# Patient Record
Sex: FEMALE | Race: WHITE | NOT HISPANIC OR LATINO | Employment: FULL TIME | URBAN - METROPOLITAN AREA
[De-identification: names, ages, dates, MRNs, and addresses within clinical notes are randomized per-mention and may not be internally consistent; named-entity substitution may affect disease eponyms.]

---

## 2017-01-26 LAB
EXTERNAL ABO GROUPING: NORMAL
EXTERNAL ANTIBODY SCREEN: NEGATIVE
EXTERNAL CHLAMYDIA SCREEN: NEGATIVE
EXTERNAL GONORRHEA SCREEN: NEGATIVE
EXTERNAL HEPATITIS B SURFACE ANTIGEN: NEGATIVE
EXTERNAL RH FACTOR: POSITIVE
EXTERNAL RUBELLA QUALITATIVE: NORMAL
EXTERNAL SYPHILIS RPR SCREEN: NORMAL
EXTERNAL URINE DRUG SCREEN: NEGATIVE
HIV1 AB SPEC QL IA.RAPID: NEGATIVE

## 2017-02-03 ENCOUNTER — OFFICE VISIT (OUTPATIENT)
Dept: INTERNAL MEDICINE | Facility: CLINIC | Age: 37
End: 2017-02-03

## 2017-02-03 VITALS — HEIGHT: 69 IN | BODY MASS INDEX: 20.06 KG/M2 | WEIGHT: 135.4 LBS | TEMPERATURE: 98.9 F

## 2017-02-03 DIAGNOSIS — J32.9 OTHER SINUSITIS, UNSPECIFIED CHRONICITY: Primary | ICD-10-CM

## 2017-02-03 PROCEDURE — 99213 OFFICE O/P EST LOW 20 MIN: CPT | Performed by: FAMILY MEDICINE

## 2017-02-03 RX ORDER — CEFDINIR 300 MG/1
300 CAPSULE ORAL 2 TIMES DAILY
Qty: 20 CAPSULE | Refills: 0 | Status: SHIPPED | OUTPATIENT
Start: 2017-02-03 | End: 2017-02-09 | Stop reason: SINTOL

## 2017-02-03 NOTE — PROGRESS NOTES
"Subjective   Jos Zuleta is a 36 y.o. female.     History of Present Illness   Here today as a work in for sinus infection. PT IS 11WK PREGNANT.  Last seen 12/22/16 for sinusitism treated with biaxin. Was seen 5/16/16 for routine. Was seen for sinusitis 9/12/14, treated with levaquin.     RESP- today pt reports she has been sick for about a week. Is having head and chest congestion. Is having yellow, brown nasal discharge.     The following portions of the patient's history were reviewed and updated as appropriate: current medications, past family history, past medical history, past social history, past surgical history and problem list.    Review of Systems   Constitutional: Positive for chills and fever.   HENT: Positive for postnasal drip and rhinorrhea.    Respiratory: Positive for cough and shortness of breath.    Cardiovascular: Positive for chest pain.   Gastrointestinal: Negative for abdominal distention and abdominal pain.   Musculoskeletal: Positive for myalgias.   Skin: Negative for color change.   Neurological: Positive for headaches. Negative for tremors and speech difficulty.   Psychiatric/Behavioral: Negative for agitation and confusion.   All other systems reviewed and are negative.        Current Outpatient Prescriptions:   •  cefdinir (OMNICEF) 300 MG capsule, Take 1 capsule by mouth 2 (Two) Times a Day., Disp: 20 capsule, Rfl: 0  •  Prenatal Vit-Fe Fumarate-FA (PREPLUS) 27-1 MG tablet, , Disp: , Rfl:   •  ZOLMitriptan (ZOMIG-ZMT) 5 MG disintegrating tablet, Dissolve 1 tab on tongue and swallow at onset of migraine. May repeat once after 2 hours. Max 10 mg/day, Disp: 9 tablet, Rfl: 11    Objective     Visit Vitals   • Temp 98.9 °F (37.2 °C)   • Ht 69\" (175.3 cm)   • Wt 135 lb 6.4 oz (61.4 kg)   • BMI 20 kg/m2       Physical Exam   Constitutional: She is oriented to person, place, and time. She appears well-developed and well-nourished.   HENT:   Right Ear: Tympanic membrane and ear canal normal. "   Left Ear: Tympanic membrane and ear canal normal.   Mouth/Throat: Oropharynx is clear and moist.   Eyes: Conjunctivae and EOM are normal. Pupils are equal, round, and reactive to light.   Neck: No thyromegaly present.   Cardiovascular: Normal rate and regular rhythm.    Pulmonary/Chest: Effort normal and breath sounds normal.   Neurological: She is alert and oriented to person, place, and time.   Skin: Skin is warm and dry.   Psychiatric: She has a normal mood and affect.   Vitals reviewed.      Assessment/Plan   Jos was seen today for sinusitis.    Diagnoses and all orders for this visit:    Other sinusitis, unspecified chronicity  -     cefdinir (OMNICEF) 300 MG capsule; Take 1 capsule by mouth 2 (Two) Times a Day.      1. RESP- sinusitis in first trimester of pregnancy. Will use omnicef and pt can use plain claritin (loratadine). Discussed that some of her fatigue is from the pregnancy. She has had her flu shot.   2. RECHECK- prn

## 2017-02-03 NOTE — PATIENT INSTRUCTIONS
1. RESP- sinusitis in first trimester of pregnancy. Will use omnicef and pt can use plain claritin (loratadine). Discussed that some of her fatigue is from the pregnancy. She has had her flu shot.   2. RECHECK- prn

## 2017-02-09 ENCOUNTER — TELEPHONE (OUTPATIENT)
Dept: INTERNAL MEDICINE | Facility: CLINIC | Age: 37
End: 2017-02-09

## 2017-02-09 RX ORDER — AMOXICILLIN AND CLAVULANATE POTASSIUM 875; 125 MG/1; MG/1
1 TABLET, FILM COATED ORAL 2 TIMES DAILY
Qty: 20 TABLET | Refills: 0 | Status: SHIPPED | OUTPATIENT
Start: 2017-02-09 | End: 2017-02-19

## 2017-02-09 NOTE — TELEPHONE ENCOUNTER
Pt , Irineo, called to say that pt abx (cefdinir) is causing the patient to have very bad migraines. He states that this happened with her first pregnancy and we changed the abx to something else. I looked back in the patients chart and couldn't find anything regarding this as we do not have all our records from [facility]. Per PATTY we can send in augmentin as this is safe for pregnancy. Pt  informed and will send rx to pharm.

## 2017-05-16 ENCOUNTER — OFFICE VISIT (OUTPATIENT)
Dept: INTERNAL MEDICINE | Facility: CLINIC | Age: 37
End: 2017-05-16

## 2017-05-16 VITALS
DIASTOLIC BLOOD PRESSURE: 74 MMHG | WEIGHT: 147.8 LBS | BODY MASS INDEX: 21.89 KG/M2 | TEMPERATURE: 99 F | HEIGHT: 69 IN | SYSTOLIC BLOOD PRESSURE: 118 MMHG

## 2017-05-16 DIAGNOSIS — G43.909 MIGRAINE SYNDROME: Primary | ICD-10-CM

## 2017-05-16 DIAGNOSIS — I83.93 VARICOSE VEINS OF BOTH LOWER EXTREMITIES: ICD-10-CM

## 2017-05-16 PROCEDURE — 99213 OFFICE O/P EST LOW 20 MIN: CPT | Performed by: FAMILY MEDICINE

## 2017-05-16 RX ORDER — PROMETHAZINE HYDROCHLORIDE 25 MG/1
TABLET ORAL
Qty: 30 TABLET | Refills: 0 | Status: SHIPPED | OUTPATIENT
Start: 2017-05-16 | End: 2017-05-18 | Stop reason: SDUPTHER

## 2017-05-18 DIAGNOSIS — G43.909 MIGRAINE SYNDROME: ICD-10-CM

## 2017-05-18 RX ORDER — PROMETHAZINE HYDROCHLORIDE 25 MG/1
TABLET ORAL
Qty: 30 TABLET | Refills: 0 | Status: SHIPPED | OUTPATIENT
Start: 2017-05-18 | End: 2017-08-18 | Stop reason: HOSPADM

## 2017-05-25 LAB — EXTERNAL GTT 1 HOUR: 107

## 2017-08-08 ENCOUNTER — LAB REQUISITION (OUTPATIENT)
Dept: LAB | Facility: HOSPITAL | Age: 37
End: 2017-08-08

## 2017-08-08 DIAGNOSIS — Z34.83 ENCOUNTER FOR SUPERVISION OF OTHER NORMAL PREGNANCY, THIRD TRIMESTER: ICD-10-CM

## 2017-08-08 PROCEDURE — 87081 CULTURE SCREEN ONLY: CPT | Performed by: OBSTETRICS & GYNECOLOGY

## 2017-08-08 RX ORDER — CEFAZOLIN SODIUM IN 0.9 % NACL 3 G/100 ML
3 INTRAVENOUS SOLUTION, PIGGYBACK (ML) INTRAVENOUS ONCE
Status: CANCELLED | OUTPATIENT
Start: 2017-08-08 | End: 2017-08-08

## 2017-08-11 LAB
BACTERIA SPEC AEROBE CULT: NORMAL
EXTERNAL GROUP B STREP ANTIGEN: NEGATIVE

## 2017-08-14 ENCOUNTER — APPOINTMENT (OUTPATIENT)
Dept: PREADMISSION TESTING | Facility: HOSPITAL | Age: 37
End: 2017-08-14

## 2017-08-14 VITALS — WEIGHT: 158.07 LBS | HEIGHT: 69 IN | BODY MASS INDEX: 23.41 KG/M2

## 2017-08-14 DIAGNOSIS — O34.219 PREVIOUS CESAREAN DELIVERY AFFECTING PREGNANCY: ICD-10-CM

## 2017-08-14 LAB
ABO GROUP BLD: NORMAL
BASOPHILS # BLD AUTO: 0.02 10*3/MM3 (ref 0–0.2)
BASOPHILS NFR BLD AUTO: 0.3 % (ref 0–1)
BLD GP AB SCN SERPL QL: NEGATIVE
DEPRECATED RDW RBC AUTO: 42.3 FL (ref 37–54)
EOSINOPHIL # BLD AUTO: 0.1 10*3/MM3 (ref 0–0.3)
EOSINOPHIL NFR BLD AUTO: 1.3 % (ref 0–3)
ERYTHROCYTE [DISTWIDTH] IN BLOOD BY AUTOMATED COUNT: 12.6 % (ref 11.3–14.5)
HCT VFR BLD AUTO: 36.1 % (ref 34.5–44)
HGB BLD-MCNC: 12.3 G/DL (ref 11.5–15.5)
IMM GRANULOCYTES # BLD: 0.03 10*3/MM3 (ref 0–0.03)
IMM GRANULOCYTES NFR BLD: 0.4 % (ref 0–0.6)
LYMPHOCYTES # BLD AUTO: 1.29 10*3/MM3 (ref 0.6–4.8)
LYMPHOCYTES NFR BLD AUTO: 16.7 % (ref 24–44)
MCH RBC QN AUTO: 31.1 PG (ref 27–31)
MCHC RBC AUTO-ENTMCNC: 34.1 G/DL (ref 32–36)
MCV RBC AUTO: 91.4 FL (ref 80–99)
MONOCYTES # BLD AUTO: 0.52 10*3/MM3 (ref 0–1)
MONOCYTES NFR BLD AUTO: 6.7 % (ref 0–12)
NEUTROPHILS # BLD AUTO: 5.76 10*3/MM3 (ref 1.5–8.3)
NEUTROPHILS NFR BLD AUTO: 74.6 % (ref 41–71)
PLATELET # BLD AUTO: 168 10*3/MM3 (ref 150–450)
PMV BLD AUTO: 11.3 FL (ref 6–12)
RBC # BLD AUTO: 3.95 10*6/MM3 (ref 3.89–5.14)
RH BLD: POSITIVE
WBC NRBC COR # BLD: 7.72 10*3/MM3 (ref 3.5–10.8)

## 2017-08-14 PROCEDURE — 86850 RBC ANTIBODY SCREEN: CPT | Performed by: OBSTETRICS & GYNECOLOGY

## 2017-08-14 PROCEDURE — 85025 COMPLETE CBC W/AUTO DIFF WBC: CPT | Performed by: OBSTETRICS & GYNECOLOGY

## 2017-08-14 PROCEDURE — 36415 COLL VENOUS BLD VENIPUNCTURE: CPT

## 2017-08-14 PROCEDURE — 86900 BLOOD TYPING SEROLOGIC ABO: CPT | Performed by: OBSTETRICS & GYNECOLOGY

## 2017-08-14 PROCEDURE — 86901 BLOOD TYPING SEROLOGIC RH(D): CPT | Performed by: OBSTETRICS & GYNECOLOGY

## 2017-08-14 NOTE — DISCHARGE INSTRUCTIONS
Dear Patient,    We are happy that you have chosen [facility] to have your baby.  We hope that by providing you with the following information, your childbirth experience will be a positive one.    What to know before your arrive:     -Do not eat, drink or chew gum after midnight the day before your procedure.    This also includes mints.   -Do not shave any part of your body including abdomen or pelvic are for two    days before your procedure.   -If you are taking a scheduled medication (insulin, blood pressure medicine,   antibiotics) please consult with your physician whether to take on the day of   surgery.   -Remove all jewelry including rings, wedding bands, and piercing before coming   to the hospital.   -Leave important valuables at home.   -Do not wear dark fingernail polish.   -Bring the following with you to the hospital:    -Picture ID and insurance, Medicare or Medicaid cards    -Co-pay/deductible required by insurance (Cash, Check, Credit Card)    -Copy of living will or power  document (if applicable)    -CPAP mask and tubing, not machine (if applicable)    -Skin prep instructions sheet    What to know the day of procedure:     -Park in the [facility][1], take elevator for first floor, exit to the right and  proceed through the doors to outside, follow the covered sidewalk to the  entrance of the [place], follow the hallway and signs to the [facility],  enter the [place] to your right BEFORE entering the 1720 lobby.  Take the  elevators to the 3rd floor ([address] [place]).   -Leave unnecessary items in your vehicle, including your suitcase.  Your support  person or a family member can get it for you after your procedure.   -Check in at the reception desk in the lobby of the 3rd floor ([address] [place]).   -One person may accompany you to the pre-op/recovery area.  Please have  other family members wait in the waiting room.   -An anesthesiologist will meet with your prior to  your procedure.   -After anesthesia has been initiated, one person may accompany you in the  operating room.   -No video cameras are permitted in the operating room; only still cameras,  Please.      What to expect while you are in recovery:     -One person may stay with you while you are in recovery.   -If the baby is stable, he/she may visit to initiate breastfeeding & [employer].

## 2017-08-15 ENCOUNTER — ANESTHESIA (OUTPATIENT)
Dept: LABOR AND DELIVERY | Facility: HOSPITAL | Age: 37
End: 2017-08-15

## 2017-08-15 ENCOUNTER — HOSPITAL ENCOUNTER (INPATIENT)
Facility: HOSPITAL | Age: 37
LOS: 3 days | Discharge: HOME | End: 2017-08-18
Attending: OBSTETRICS & GYNECOLOGY | Admitting: OBSTETRICS & GYNECOLOGY

## 2017-08-15 ENCOUNTER — ANESTHESIA EVENT (OUTPATIENT)
Dept: LABOR AND DELIVERY | Facility: HOSPITAL | Age: 37
End: 2017-08-15

## 2017-08-15 VITALS — HEART RATE: 79 BPM | DIASTOLIC BLOOD PRESSURE: 46 MMHG | SYSTOLIC BLOOD PRESSURE: 90 MMHG | OXYGEN SATURATION: 97 %

## 2017-08-15 DIAGNOSIS — O34.219 PREVIOUS CESAREAN DELIVERY AFFECTING PREGNANCY: Primary | ICD-10-CM

## 2017-08-15 PROBLEM — Z34.90 CURRENTLY PREGNANT: Status: ACTIVE | Noted: 2017-08-15

## 2017-08-15 PROCEDURE — 25010000002 ONDANSETRON PER 1 MG: Performed by: NURSE ANESTHETIST, CERTIFIED REGISTERED

## 2017-08-15 PROCEDURE — 25010000002 PHENYLEPHRINE PER 1 ML: Performed by: NURSE ANESTHETIST, CERTIFIED REGISTERED

## 2017-08-15 PROCEDURE — 25010000002 MIDAZOLAM PER 1 MG: Performed by: NURSE ANESTHETIST, CERTIFIED REGISTERED

## 2017-08-15 PROCEDURE — 25010000003 MORPHINE PER 10 MG: Performed by: NURSE ANESTHETIST, CERTIFIED REGISTERED

## 2017-08-15 PROCEDURE — 25010000002 FENTANYL CITRATE (PF) 100 MCG/2ML SOLUTION: Performed by: NURSE ANESTHETIST, CERTIFIED REGISTERED

## 2017-08-15 PROCEDURE — 59025 FETAL NON-STRESS TEST: CPT

## 2017-08-15 PROCEDURE — 25010000003 CEFAZOLIN IN DEXTROSE 2-4 GM/100ML-% SOLUTION: Performed by: OBSTETRICS & GYNECOLOGY

## 2017-08-15 PROCEDURE — C1755 CATHETER, INTRASPINAL: HCPCS

## 2017-08-15 PROCEDURE — 63710000001 DIPHENHYDRAMINE PER 50 MG: Performed by: NURSE ANESTHETIST, CERTIFIED REGISTERED

## 2017-08-15 RX ORDER — SODIUM CHLORIDE, SODIUM LACTATE, POTASSIUM CHLORIDE, CALCIUM CHLORIDE 600; 310; 30; 20 MG/100ML; MG/100ML; MG/100ML; MG/100ML
100 INJECTION, SOLUTION INTRAVENOUS CONTINUOUS
Status: DISCONTINUED | OUTPATIENT
Start: 2017-08-15 | End: 2017-08-15 | Stop reason: SDUPTHER

## 2017-08-15 RX ORDER — MORPHINE SULFATE 2 MG/ML
2 INJECTION, SOLUTION INTRAMUSCULAR; INTRAVENOUS EVERY 4 HOURS PRN
Status: DISCONTINUED | OUTPATIENT
Start: 2017-08-15 | End: 2017-08-18 | Stop reason: HOSPADM

## 2017-08-15 RX ORDER — LANOLIN 100 %
OINTMENT (GRAM) TOPICAL
Status: DISCONTINUED | OUTPATIENT
Start: 2017-08-15 | End: 2017-08-18 | Stop reason: HOSPADM

## 2017-08-15 RX ORDER — OXYCODONE HYDROCHLORIDE AND ACETAMINOPHEN 5; 325 MG/1; MG/1
2 TABLET ORAL ONCE AS NEEDED
Status: DISCONTINUED | OUTPATIENT
Start: 2017-08-15 | End: 2017-08-15 | Stop reason: HOSPADM

## 2017-08-15 RX ORDER — MORPHINE SULFATE 4 MG/ML
2 INJECTION, SOLUTION INTRAMUSCULAR; INTRAVENOUS
Status: DISCONTINUED | OUTPATIENT
Start: 2017-08-15 | End: 2017-08-15 | Stop reason: HOSPADM

## 2017-08-15 RX ORDER — ACETAMINOPHEN 325 MG/1
650 TABLET ORAL EVERY 4 HOURS PRN
Status: DISCONTINUED | OUTPATIENT
Start: 2017-08-15 | End: 2017-08-18 | Stop reason: HOSPADM

## 2017-08-15 RX ORDER — MIDAZOLAM HYDROCHLORIDE 1 MG/ML
INJECTION INTRAMUSCULAR; INTRAVENOUS AS NEEDED
Status: DISCONTINUED | OUTPATIENT
Start: 2017-08-15 | End: 2017-08-15 | Stop reason: SURG

## 2017-08-15 RX ORDER — FENTANYL CITRATE 50 UG/ML
INJECTION, SOLUTION INTRAMUSCULAR; INTRAVENOUS AS NEEDED
Status: DISCONTINUED | OUTPATIENT
Start: 2017-08-15 | End: 2017-08-15 | Stop reason: SURG

## 2017-08-15 RX ORDER — DIPHENHYDRAMINE HYDROCHLORIDE 50 MG/ML
25 INJECTION INTRAMUSCULAR; INTRAVENOUS EVERY 4 HOURS PRN
Status: DISCONTINUED | OUTPATIENT
Start: 2017-08-15 | End: 2017-08-18 | Stop reason: HOSPADM

## 2017-08-15 RX ORDER — PROMETHAZINE HYDROCHLORIDE 12.5 MG/1
12.5 SUPPOSITORY RECTAL EVERY 6 HOURS PRN
Status: DISCONTINUED | OUTPATIENT
Start: 2017-08-15 | End: 2017-08-18 | Stop reason: HOSPADM

## 2017-08-15 RX ORDER — BUPIVACAINE HYDROCHLORIDE 7.5 MG/ML
INJECTION, SOLUTION EPIDURAL; RETROBULBAR AS NEEDED
Status: DISCONTINUED | OUTPATIENT
Start: 2017-08-15 | End: 2017-08-15 | Stop reason: SURG

## 2017-08-15 RX ORDER — SIMETHICONE 80 MG
80 TABLET,CHEWABLE ORAL 4 TIMES DAILY PRN
Status: DISCONTINUED | OUTPATIENT
Start: 2017-08-15 | End: 2017-08-18 | Stop reason: HOSPADM

## 2017-08-15 RX ORDER — DIPHENHYDRAMINE HCL 25 MG
25 CAPSULE ORAL EVERY 4 HOURS PRN
Status: DISCONTINUED | OUTPATIENT
Start: 2017-08-15 | End: 2017-08-18 | Stop reason: HOSPADM

## 2017-08-15 RX ORDER — HYDROCODONE BITARTRATE AND ACETAMINOPHEN 5; 325 MG/1; MG/1
2 TABLET ORAL EVERY 4 HOURS PRN
Status: DISCONTINUED | OUTPATIENT
Start: 2017-08-15 | End: 2017-08-18 | Stop reason: HOSPADM

## 2017-08-15 RX ORDER — SODIUM CHLORIDE, SODIUM LACTATE, POTASSIUM CHLORIDE, CALCIUM CHLORIDE 600; 310; 30; 20 MG/100ML; MG/100ML; MG/100ML; MG/100ML
125 INJECTION, SOLUTION INTRAVENOUS CONTINUOUS
Status: DISCONTINUED | OUTPATIENT
Start: 2017-08-15 | End: 2017-08-18 | Stop reason: HOSPADM

## 2017-08-15 RX ORDER — PROMETHAZINE HYDROCHLORIDE 25 MG/1
25 TABLET ORAL EVERY 6 HOURS PRN
Status: DISCONTINUED | OUTPATIENT
Start: 2017-08-15 | End: 2017-08-18 | Stop reason: HOSPADM

## 2017-08-15 RX ORDER — OXYCODONE HYDROCHLORIDE AND ACETAMINOPHEN 5; 325 MG/1; MG/1
1 TABLET ORAL EVERY 4 HOURS PRN
Status: DISCONTINUED | OUTPATIENT
Start: 2017-08-15 | End: 2017-08-16

## 2017-08-15 RX ORDER — NALOXONE HCL 0.4 MG/ML
0.4 VIAL (ML) INJECTION
Status: DISCONTINUED | OUTPATIENT
Start: 2017-08-15 | End: 2017-08-18 | Stop reason: HOSPADM

## 2017-08-15 RX ORDER — DOCUSATE SODIUM 100 MG/1
100 CAPSULE, LIQUID FILLED ORAL 2 TIMES DAILY PRN
Status: DISCONTINUED | OUTPATIENT
Start: 2017-08-15 | End: 2017-08-18 | Stop reason: HOSPADM

## 2017-08-15 RX ORDER — TRISODIUM CITRATE DIHYDRATE AND CITRIC ACID MONOHYDRATE 500; 334 MG/5ML; MG/5ML
30 SOLUTION ORAL ONCE
Status: COMPLETED | OUTPATIENT
Start: 2017-08-15 | End: 2017-08-15

## 2017-08-15 RX ORDER — SODIUM CHLORIDE 0.9 % (FLUSH) 0.9 %
1-10 SYRINGE (ML) INJECTION AS NEEDED
Status: DISCONTINUED | OUTPATIENT
Start: 2017-08-15 | End: 2017-08-16

## 2017-08-15 RX ORDER — CARBOPROST TROMETHAMINE 250 UG/ML
250 INJECTION, SOLUTION INTRAMUSCULAR ONCE
Status: DISCONTINUED | OUTPATIENT
Start: 2017-08-15 | End: 2017-08-18 | Stop reason: HOSPADM

## 2017-08-15 RX ORDER — CEFAZOLIN SODIUM 2 G/100ML
2 INJECTION, SOLUTION INTRAVENOUS ONCE
Status: COMPLETED | OUTPATIENT
Start: 2017-08-15 | End: 2017-08-15

## 2017-08-15 RX ORDER — MORPHINE SULFATE 0.5 MG/ML
INJECTION, SOLUTION EPIDURAL; INTRATHECAL; INTRAVENOUS AS NEEDED
Status: DISCONTINUED | OUTPATIENT
Start: 2017-08-15 | End: 2017-08-15 | Stop reason: SURG

## 2017-08-15 RX ORDER — ONDANSETRON 2 MG/ML
INJECTION INTRAMUSCULAR; INTRAVENOUS AS NEEDED
Status: DISCONTINUED | OUTPATIENT
Start: 2017-08-15 | End: 2017-08-15 | Stop reason: SURG

## 2017-08-15 RX ORDER — POLYETHYLENE GLYCOL 3350 17 G/17G
17 POWDER, FOR SOLUTION ORAL DAILY
Status: DISCONTINUED | OUTPATIENT
Start: 2017-08-15 | End: 2017-08-18 | Stop reason: HOSPADM

## 2017-08-15 RX ORDER — OXYTOCIN 10 [USP'U]/ML
INJECTION, SOLUTION INTRAMUSCULAR; INTRAVENOUS AS NEEDED
Status: DISCONTINUED | OUTPATIENT
Start: 2017-08-15 | End: 2017-08-15 | Stop reason: SURG

## 2017-08-15 RX ORDER — ONDANSETRON 2 MG/ML
4 INJECTION INTRAMUSCULAR; INTRAVENOUS ONCE
Status: DISCONTINUED | OUTPATIENT
Start: 2017-08-15 | End: 2017-08-15 | Stop reason: HOSPADM

## 2017-08-15 RX ORDER — FAMOTIDINE 10 MG/ML
INJECTION, SOLUTION INTRAVENOUS AS NEEDED
Status: DISCONTINUED | OUTPATIENT
Start: 2017-08-15 | End: 2017-08-15 | Stop reason: SURG

## 2017-08-15 RX ORDER — ACETAMINOPHEN 325 MG/1
650 TABLET ORAL ONCE AS NEEDED
Status: DISCONTINUED | OUTPATIENT
Start: 2017-08-15 | End: 2017-08-15 | Stop reason: HOSPADM

## 2017-08-15 RX ORDER — IBUPROFEN 600 MG/1
600 TABLET ORAL ONCE AS NEEDED
Status: DISCONTINUED | OUTPATIENT
Start: 2017-08-15 | End: 2017-08-15 | Stop reason: HOSPADM

## 2017-08-15 RX ORDER — METOCLOPRAMIDE HYDROCHLORIDE 5 MG/ML
10 INJECTION INTRAMUSCULAR; INTRAVENOUS ONCE AS NEEDED
Status: DISCONTINUED | OUTPATIENT
Start: 2017-08-15 | End: 2017-08-15 | Stop reason: HOSPADM

## 2017-08-15 RX ORDER — NALOXONE HCL 0.4 MG/ML
0.4 VIAL (ML) INJECTION
Status: ACTIVE | OUTPATIENT
Start: 2017-08-15 | End: 2017-08-16

## 2017-08-15 RX ORDER — IBUPROFEN 800 MG/1
800 TABLET ORAL EVERY 8 HOURS PRN
Status: DISCONTINUED | OUTPATIENT
Start: 2017-08-15 | End: 2017-08-18 | Stop reason: HOSPADM

## 2017-08-15 RX ORDER — MISOPROSTOL 200 UG/1
800 TABLET ORAL AS NEEDED
Status: DISCONTINUED | OUTPATIENT
Start: 2017-08-15 | End: 2017-08-18 | Stop reason: HOSPADM

## 2017-08-15 RX ORDER — HYDROMORPHONE HYDROCHLORIDE 1 MG/ML
0.5 INJECTION, SOLUTION INTRAMUSCULAR; INTRAVENOUS; SUBCUTANEOUS
Status: DISCONTINUED | OUTPATIENT
Start: 2017-08-15 | End: 2017-08-15 | Stop reason: HOSPADM

## 2017-08-15 RX ORDER — PROMETHAZINE HYDROCHLORIDE 25 MG/ML
12.5 INJECTION, SOLUTION INTRAMUSCULAR; INTRAVENOUS EVERY 6 HOURS PRN
Status: DISCONTINUED | OUTPATIENT
Start: 2017-08-15 | End: 2017-08-18 | Stop reason: HOSPADM

## 2017-08-15 RX ORDER — METHYLERGONOVINE MALEATE 0.2 MG/ML
200 INJECTION INTRAVENOUS ONCE
Status: DISCONTINUED | OUTPATIENT
Start: 2017-08-15 | End: 2017-08-18 | Stop reason: HOSPADM

## 2017-08-15 RX ADMIN — IBUPROFEN 800 MG: 800 TABLET ORAL at 16:08

## 2017-08-15 RX ADMIN — SODIUM CHLORIDE, POTASSIUM CHLORIDE, SODIUM LACTATE AND CALCIUM CHLORIDE 125 ML/HR: 600; 310; 30; 20 INJECTION, SOLUTION INTRAVENOUS at 12:32

## 2017-08-15 RX ADMIN — SODIUM CITRATE AND CITRIC ACID MONOHYDRATE 30 ML: 500; 334 SOLUTION ORAL at 10:38

## 2017-08-15 RX ADMIN — DIPHENHYDRAMINE HYDROCHLORIDE 25 MG: 25 CAPSULE ORAL at 22:18

## 2017-08-15 RX ADMIN — SODIUM CHLORIDE, POTASSIUM CHLORIDE, SODIUM LACTATE AND CALCIUM CHLORIDE: 600; 310; 30; 20 INJECTION, SOLUTION INTRAVENOUS at 11:25

## 2017-08-15 RX ADMIN — CEFAZOLIN SODIUM 2 G: 2 INJECTION, SOLUTION INTRAVENOUS at 10:38

## 2017-08-15 RX ADMIN — SIMETHICONE 80 MG: 80 TABLET, CHEWABLE ORAL at 20:23

## 2017-08-15 RX ADMIN — OXYTOCIN 30 UNITS: 10 INJECTION, SOLUTION INTRAMUSCULAR; INTRAVENOUS at 11:25

## 2017-08-15 RX ADMIN — PHENYLEPHRINE HYDROCHLORIDE 100 MCG: 10 INJECTION INTRAVENOUS at 11:03

## 2017-08-15 RX ADMIN — MIDAZOLAM HYDROCHLORIDE 1 MG: 1 INJECTION, SOLUTION INTRAMUSCULAR; INTRAVENOUS at 10:43

## 2017-08-15 RX ADMIN — OXYTOCIN 30 UNITS: 10 INJECTION, SOLUTION INTRAMUSCULAR; INTRAVENOUS at 11:07

## 2017-08-15 RX ADMIN — SODIUM CHLORIDE, POTASSIUM CHLORIDE, SODIUM LACTATE AND CALCIUM CHLORIDE: 600; 310; 30; 20 INJECTION, SOLUTION INTRAVENOUS at 10:42

## 2017-08-15 RX ADMIN — SODIUM CHLORIDE, POTASSIUM CHLORIDE, SODIUM LACTATE AND CALCIUM CHLORIDE: 600; 310; 30; 20 INJECTION, SOLUTION INTRAVENOUS at 11:00

## 2017-08-15 RX ADMIN — FAMOTIDINE 20 MG: 10 INJECTION, SOLUTION INTRAVENOUS at 10:43

## 2017-08-15 RX ADMIN — OXYCODONE AND ACETAMINOPHEN 1 TABLET: 5; 325 TABLET ORAL at 13:59

## 2017-08-15 RX ADMIN — OXYCODONE AND ACETAMINOPHEN 1 TABLET: 5; 325 TABLET ORAL at 22:12

## 2017-08-15 RX ADMIN — FENTANYL CITRATE 15 MCG: 50 INJECTION, SOLUTION INTRAMUSCULAR; INTRAVENOUS at 10:51

## 2017-08-15 RX ADMIN — MORPHINE SULFATE 150 MCG: 0.5 INJECTION, SOLUTION EPIDURAL; INTRATHECAL; INTRAVENOUS at 10:51

## 2017-08-15 RX ADMIN — ONDANSETRON 4 MG: 2 INJECTION INTRAMUSCULAR; INTRAVENOUS at 10:43

## 2017-08-15 RX ADMIN — OXYCODONE AND ACETAMINOPHEN 1 TABLET: 5; 325 TABLET ORAL at 18:08

## 2017-08-15 RX ADMIN — SODIUM CHLORIDE, POTASSIUM CHLORIDE, SODIUM LACTATE AND CALCIUM CHLORIDE 125 ML/HR: 600; 310; 30; 20 INJECTION, SOLUTION INTRAVENOUS at 20:56

## 2017-08-15 RX ADMIN — PHENYLEPHRINE HYDROCHLORIDE 100 MCG: 10 INJECTION INTRAVENOUS at 10:57

## 2017-08-15 RX ADMIN — BUPIVACAINE HYDROCHLORIDE 1.7 ML: 7.5 INJECTION, SOLUTION EPIDURAL; RETROBULBAR at 10:51

## 2017-08-15 NOTE — OP NOTE
Operative Note    Patient name: Jos Zuleta  YOB: 1980   MRN: [id number]  Admission Date: 8/15/2017  Referring Provider: Pearl Smith MD    ID: 36 y.o.  at 39w0d    Preoperative Diagnosis:   Patient Active Problem List   Diagnosis   • Migraine syndrome   • Hyperthyroidism   • Currently pregnant       Postoperative Diagnosis: Same as above.    Procedure(s): repeatlow transverse  delivery     Surgeons: Surgeon(s) and Role:     * Pearl Smith MD - Primary    Anesthesia: Spinal    Estimated Blood Loss: 700 mL mL    IV Fluids: [unfilled]    Preoperative antibiotic: Ancef (cefazolin) 2 grams    Blood products:   Blood Administration Record     None          Pathology: * No orders in the log *    Drains: Gibson catheter to gravity    Complications: None    Condition: Stable to recovery room                                          Infant:                 Gender: female  infant    Weight: No birth weight on file.     Apgars: 8   @ 1 minute /     9   @ 5 minutes    Cord gases: Venous:  @BABYNOHDR(BRIEFLAB, PHCVEN, BECVEN)@     Arterial:  @BABYNOHDR(BRIEFLAB, PHCART, BECART)@         Operative Summary:   After obtaining informed consent the patient was taken to the operating room where adequate anesthesia was obtained.  Gibson catheter was placed in the bladder preoperatively.  IV antibiotics were given preoperatively.       The abdomen was prepped and draped in the usual sterile fashion for  delivery.  After confirming adequate anesthesia a Pfannenstiel skin incision was made with the scalpel and carried through to the underlying layer of fascia.  The fascia was incised in the midline and the incision extended laterally with the Saleem scissors and with blunt dissection.       The upper aspect of the fascia was grasped with 2 Kocher clamps, elevated, and dissected off the underlying rectus muscles bluntly and with the Saleem scissors.  The Kocher clamps were removed and applied to the  inferior aspect of the fascia.  The fascia was dissected off of the rectus muscles in the same fashion.  The peritoneum was entered bluntly.  The incision was stretched and the bladder blade and Singh retractor inserted for visualization of the uterus.  A bladder flap was made and bladder blade replaced.      The uterus was incised with the scalpel in a low transverse fashion.  The uterine incision was entered digitally and the incision extended bluntly in a cranial-caudal fashion.  Retractors were removed and membranes were ruptured.  The infant was delivered atraumatically from vtx presentation.  The umbilical cord was clamped and cut, after cord milking 4 times, and the nose and mouth bulb suctioned.  The infant was handed off to waiting pediatric staff.       Cord blood gases were not collected.  Cord blood was collected.  The placenta was removed using cord traction and uterine massage.  The uterus was exteriorized and cleared of all clots and debris.  The uterine incision was repaired with #1 Chromic gut in a running locked fashion. A double-layer technique was used.  Additional hemostatic measures required: none.    The incision was inspected and excellent hemostasis was noted.  The tubes and ovaries were noted to be normal.  The appendix was not visualized..  The uterus was returned to the abdomen.  The gutters were cleared of all clots and debris.  Irrigation was used.  The uterine incision was again inspected and found to be hemostatic.       The peritoneum was reapproximated with 2-0 Vicryl.  The fascia was closed with 0 Vicryl in a running fashion.  The subcutaneous space was reapproximated using 3-0 Plain gut.      The skin was closed usingStaples.  All sharp, instrument and sponge counts were correct. The patient was transferred to the recovery room in stable condition.    Pearl Smith MD  8/15/2017

## 2017-08-15 NOTE — ANESTHESIA PREPROCEDURE EVALUATION
Anesthesia Evaluation     Patient summary reviewed and Nursing notes reviewed   NPO Solid Status: > 8 hours  NPO Liquid Status: > 8 hours     Airway   Mallampati: II  TM distance: >3 FB  Neck ROM: full  Dental      Pulmonary - negative pulmonary ROS   Cardiovascular - negative cardio ROS        Neuro/Psych  (+) headaches,    GI/Hepatic/Renal/Endo    (+)  GERD, hyperthyroidism    Musculoskeletal     Abdominal    Substance History - negative use     OB/GYN    (+) Pregnant,         Other - negative ROS                                       Anesthesia Plan    ASA 2     spinal and ITN     Anesthetic plan and risks discussed with patient.

## 2017-08-15 NOTE — H&P
Subjective     Chief Complaint   Patient presents with   • Scheduled      1030 C/S       Jos Zuleta is a 36 y.o. year old  with an Estimated Date of Delivery: 17 currently at 39w0d presenting with for her scheduled repeat c/s.    Prenatal care has been with Dr. smith.  It has been significant for AMA    No Additional Complaints Reported    The following portions of the patient's history were reviewed and updated as appropriate:vital signs, allergies, current medications, past medical history, past social history, past surgical history and problem list.    Review of Systems  Pertinent items are noted in HPI.     Objective     /76 (BP Location: Right arm)  Temp 98 °F (36.7 °C) (Oral)   Breastfeeding? No    Physical Exam    General:  well developed; well nourished  no acute distress           Abdomen: soft, non-tender; no masses  no umbilical or inginual hernias are present  no hepato-splenomegaly  fundus firm and non-tender       FHT's: reactive and category 1               Assessment/Plan     ASSESSMENT  1. IUP at 39w0d  2. History of prev c/s, desires repeat  3. AMA    PLAN  1. Repeat c/s.         Pearl Smith MD  8/15/2017@

## 2017-08-15 NOTE — PLAN OF CARE
Problem:  Delivery (Adult,Obstetrics,Pediatric)  Goal: Signs and Symptoms of Listed Potential Problems Will be Absent or Manageable ( Delivery)  Outcome: Ongoing (interventions implemented as appropriate)

## 2017-08-15 NOTE — ANESTHESIA PROCEDURE NOTES
Spinal Block    Patient location during procedure: OR  Indication:at surgeon's request  Performed By  Anesthesiologist: RODRIGUEZ PERALTA  CRNA: KAY FRIEDMAN  Preanesthetic Checklist  Completed: patient identified, surgical consent, pre-op evaluation, timeout performed, IV checked, risks and benefits discussed and monitors and equipment checked  Spinal Block Prep:  Patient Position:sitting  Sterile Tech:cap, gloves, mask and sterile barriers  Prep:Betadine  Patient Monitoring:blood pressure monitoring, continuous pulse oximetry and EKG  Spinal Block Procedure  Approach:midline  Guidance:palpation technique  Location:L3-L4  Needle Type:Asher  Needle Gauge:25 G  Placement of Spinal needle event:cerebrospinal fluid aspirated  Paresthesia: no  Fluid Appearance:clear  Post Assessment  Patient Tolerance:patient tolerated the procedure well with no apparent complications  Complications no

## 2017-08-16 LAB
BASOPHILS # BLD AUTO: 0.03 10*3/MM3 (ref 0–0.2)
BASOPHILS NFR BLD AUTO: 0.4 % (ref 0–1)
DEPRECATED RDW RBC AUTO: 42.5 FL (ref 37–54)
EOSINOPHIL # BLD AUTO: 0.16 10*3/MM3 (ref 0–0.3)
EOSINOPHIL NFR BLD AUTO: 2 % (ref 0–3)
ERYTHROCYTE [DISTWIDTH] IN BLOOD BY AUTOMATED COUNT: 12.7 % (ref 11.3–14.5)
HCT VFR BLD AUTO: 32.1 % (ref 34.5–44)
HGB BLD-MCNC: 11 G/DL (ref 11.5–15.5)
IMM GRANULOCYTES # BLD: 0.02 10*3/MM3 (ref 0–0.03)
IMM GRANULOCYTES NFR BLD: 0.3 % (ref 0–0.6)
LYMPHOCYTES # BLD AUTO: 1.08 10*3/MM3 (ref 0.6–4.8)
LYMPHOCYTES NFR BLD AUTO: 13.6 % (ref 24–44)
MCH RBC QN AUTO: 31.4 PG (ref 27–31)
MCHC RBC AUTO-ENTMCNC: 34.3 G/DL (ref 32–36)
MCV RBC AUTO: 91.7 FL (ref 80–99)
MONOCYTES # BLD AUTO: 0.5 10*3/MM3 (ref 0–1)
MONOCYTES NFR BLD AUTO: 6.3 % (ref 0–12)
NEUTROPHILS # BLD AUTO: 6.16 10*3/MM3 (ref 1.5–8.3)
NEUTROPHILS NFR BLD AUTO: 77.4 % (ref 41–71)
PLATELET # BLD AUTO: 138 10*3/MM3 (ref 150–450)
PMV BLD AUTO: 11.1 FL (ref 6–12)
RBC # BLD AUTO: 3.5 10*6/MM3 (ref 3.89–5.14)
WBC NRBC COR # BLD: 7.95 10*3/MM3 (ref 3.5–10.8)

## 2017-08-16 PROCEDURE — 85025 COMPLETE CBC W/AUTO DIFF WBC: CPT | Performed by: OBSTETRICS & GYNECOLOGY

## 2017-08-16 PROCEDURE — 63710000001 DIPHENHYDRAMINE PER 50 MG: Performed by: NURSE ANESTHETIST, CERTIFIED REGISTERED

## 2017-08-16 RX ORDER — OXYCODONE HYDROCHLORIDE AND ACETAMINOPHEN 5; 325 MG/1; MG/1
2 TABLET ORAL EVERY 4 HOURS PRN
Status: DISCONTINUED | OUTPATIENT
Start: 2017-08-16 | End: 2017-08-18 | Stop reason: HOSPADM

## 2017-08-16 RX ADMIN — DIPHENHYDRAMINE HYDROCHLORIDE 25 MG: 25 CAPSULE ORAL at 01:56

## 2017-08-16 RX ADMIN — OXYCODONE AND ACETAMINOPHEN 1 TABLET: 5; 325 TABLET ORAL at 10:12

## 2017-08-16 RX ADMIN — OXYCODONE AND ACETAMINOPHEN 1 TABLET: 5; 325 TABLET ORAL at 01:56

## 2017-08-16 RX ADMIN — SIMETHICONE 80 MG: 80 TABLET, CHEWABLE ORAL at 13:20

## 2017-08-16 RX ADMIN — OXYCODONE AND ACETAMINOPHEN 2 TABLET: 5; 325 TABLET ORAL at 18:25

## 2017-08-16 RX ADMIN — OXYCODONE AND ACETAMINOPHEN 1 TABLET: 5; 325 TABLET ORAL at 14:05

## 2017-08-16 RX ADMIN — DOCUSATE SODIUM 100 MG: 100 CAPSULE, LIQUID FILLED ORAL at 18:25

## 2017-08-16 RX ADMIN — OXYCODONE AND ACETAMINOPHEN 1 TABLET: 5; 325 TABLET ORAL at 06:21

## 2017-08-16 RX ADMIN — IBUPROFEN 800 MG: 800 TABLET ORAL at 08:00

## 2017-08-16 RX ADMIN — IBUPROFEN 800 MG: 800 TABLET ORAL at 00:17

## 2017-08-16 RX ADMIN — IBUPROFEN 800 MG: 800 TABLET ORAL at 15:54

## 2017-08-16 RX ADMIN — OXYCODONE AND ACETAMINOPHEN 2 TABLET: 5; 325 TABLET ORAL at 22:28

## 2017-08-16 RX ADMIN — DOCUSATE SODIUM 100 MG: 100 CAPSULE, LIQUID FILLED ORAL at 08:00

## 2017-08-16 NOTE — PROGRESS NOTES
2017    Name:Jos Zuleta    MR#:[id number]     PROGRESS NOTE:  Post-Op day 1 S/P    HD:1    Subjective   36 y.o. yo Female  s/p CS at 39w0d doing well. Pain well controlled. Tolerating regular diet and having flatus. Lochia normal.     Patient Active Problem List   Diagnosis   • Migraine syndrome   • Hyperthyroidism   • Currently pregnant        Objective    Vitals  Temp:  Temp:  [97.5 °F (36.4 °C)-98.5 °F (36.9 °C)] 98.5 °F (36.9 °C)  Temp src: Oral  BP:  BP: ()/(45-78) 107/58  Pulse:  Heart Rate:  [52-84] 74  RR:   Resp:  [16-18] 16    General Awake, alert, no distress  Abdomen Soft, non-distended, fundus firm, below umbilicus, appropriately tender  Incision  Bandage closed, dry, intact  Extremities Calves NT bilaterally     I/O last 3 completed shifts:  In: 2600 [I.V.:2600]  Out: 5175 [Urine:4475; Blood:700]    LABS:   Lab Results   Component Value Date    WBC 7.95 2017    HGB 11.0 (L) 2017    HCT 32.1 (L) 2017    MCV 91.7 2017     (L) 2017       Infant: female       Assessment   1.  POD day 1    Plan: Doing well.  Routine postoperative care      Active Problems:   None      LINDA Blanco  2017 9:50 AM

## 2017-08-16 NOTE — ANESTHESIA POSTPROCEDURE EVALUATION
Patient: Jos Zuleta    Procedure Summary     Date Anesthesia Start Anesthesia Stop Room / Location    08/15/17 1042 1129 [facility]   [facility]       Procedure Diagnosis Surgeon Provider     SECTION REPEAT (N/A Abdomen) No diagnosis on file. MD Joe Padilla MD          Anesthesia Type: spinal, ITN  Last vitals  BP        Temp        Pulse       Resp        SpO2          Post Anesthesia Care and Evaluation    Patient location during evaluation: bedside  Patient participation: complete - patient participated  Level of consciousness: awake and alert  Pain management: adequate  Airway patency: patent  Anesthetic complications: No anesthetic complications    Cardiovascular status: acceptable  Respiratory status: acceptable  Hydration status: acceptable  Post Neuraxial Block status: Motor and sensory function returned to baseline and No signs or symptoms of PDPH

## 2017-08-17 RX ADMIN — OXYCODONE AND ACETAMINOPHEN 2 TABLET: 5; 325 TABLET ORAL at 06:13

## 2017-08-17 RX ADMIN — OXYCODONE AND ACETAMINOPHEN 2 TABLET: 5; 325 TABLET ORAL at 20:12

## 2017-08-17 RX ADMIN — OXYCODONE AND ACETAMINOPHEN 2 TABLET: 5; 325 TABLET ORAL at 15:19

## 2017-08-17 RX ADMIN — IBUPROFEN 800 MG: 800 TABLET ORAL at 18:19

## 2017-08-17 RX ADMIN — SIMETHICONE 80 MG: 80 TABLET, CHEWABLE ORAL at 13:40

## 2017-08-17 RX ADMIN — IBUPROFEN 800 MG: 800 TABLET ORAL at 02:23

## 2017-08-17 RX ADMIN — SIMETHICONE 80 MG: 80 TABLET, CHEWABLE ORAL at 09:52

## 2017-08-17 RX ADMIN — OXYCODONE AND ACETAMINOPHEN 2 TABLET: 5; 325 TABLET ORAL at 10:09

## 2017-08-17 RX ADMIN — IBUPROFEN 800 MG: 800 TABLET ORAL at 10:09

## 2017-08-17 RX ADMIN — DOCUSATE SODIUM 100 MG: 100 CAPSULE, LIQUID FILLED ORAL at 18:19

## 2017-08-17 RX ADMIN — OXYCODONE AND ACETAMINOPHEN 2 TABLET: 5; 325 TABLET ORAL at 02:23

## 2017-08-17 RX ADMIN — DOCUSATE SODIUM 100 MG: 100 CAPSULE, LIQUID FILLED ORAL at 09:52

## 2017-08-17 RX ADMIN — POLYETHYLENE GLYCOL 3350 17 G: 17 POWDER, FOR SOLUTION ORAL at 09:52

## 2017-08-17 RX ADMIN — SIMETHICONE 80 MG: 80 TABLET, CHEWABLE ORAL at 18:19

## 2017-08-17 NOTE — PLAN OF CARE
Problem: Patient Care Overview (Adult)  Goal: Plan of Care Review  Outcome: Ongoing (interventions implemented as appropriate)    17 1022   Coping/Psychosocial Response Interventions   Plan Of Care Reviewed With patient;spouse   Patient Care Overview   Progress improving       Goal: Adult Individualization and Mutuality  Outcome: Ongoing (interventions implemented as appropriate)  Goal: Discharge Needs Assessment  Outcome: Ongoing (interventions implemented as appropriate)    Problem: Postpartum ( Delivery) (Adult)  Goal: Signs and Symptoms of Listed Potential Problems Will be Absent or Manageable (Postpartum)  Outcome: Ongoing (interventions implemented as appropriate)

## 2017-08-18 VITALS
TEMPERATURE: 98.2 F | SYSTOLIC BLOOD PRESSURE: 122 MMHG | DIASTOLIC BLOOD PRESSURE: 75 MMHG | RESPIRATION RATE: 14 BRPM | OXYGEN SATURATION: 98 % | HEART RATE: 74 BPM

## 2017-08-18 PROBLEM — Z34.90 CURRENTLY PREGNANT: Status: RESOLVED | Noted: 2017-08-15 | Resolved: 2017-08-18

## 2017-08-18 RX ORDER — OXYCODONE HYDROCHLORIDE AND ACETAMINOPHEN 5; 325 MG/1; MG/1
1-2 TABLET ORAL EVERY 4 HOURS PRN
Qty: 20 TABLET | Refills: 0 | Status: SHIPPED | OUTPATIENT
Start: 2017-08-18 | End: 2017-08-21

## 2017-08-18 RX ORDER — IBUPROFEN 800 MG/1
800 TABLET ORAL EVERY 8 HOURS PRN
Qty: 30 TABLET | Refills: 0 | Status: SHIPPED | OUTPATIENT
Start: 2017-08-18 | End: 2017-09-06

## 2017-08-18 RX ADMIN — OXYCODONE AND ACETAMINOPHEN 2 TABLET: 5; 325 TABLET ORAL at 09:53

## 2017-08-18 RX ADMIN — POLYETHYLENE GLYCOL 3350 17 G: 17 POWDER, FOR SOLUTION ORAL at 09:53

## 2017-08-18 RX ADMIN — OXYCODONE AND ACETAMINOPHEN 2 TABLET: 5; 325 TABLET ORAL at 00:01

## 2017-08-18 RX ADMIN — IBUPROFEN 800 MG: 800 TABLET ORAL at 04:26

## 2017-08-18 RX ADMIN — SIMETHICONE 80 MG: 80 TABLET, CHEWABLE ORAL at 09:53

## 2017-08-18 RX ADMIN — DOCUSATE SODIUM 100 MG: 100 CAPSULE, LIQUID FILLED ORAL at 09:53

## 2017-08-18 RX ADMIN — OXYCODONE AND ACETAMINOPHEN 2 TABLET: 5; 325 TABLET ORAL at 04:26

## 2017-08-18 NOTE — DISCHARGE SUMMARY
Discharge Summary    Date of Admission: 8/15/2017  Date of Discharge:  2017      Patient: Jos Zuleta      MR#:[id number]    Primary Surgeon/OB: Pearl Smith MD    Presenting Problem/History of Present Illness  Currently pregnant [Z33.1]     Patient Active Problem List   Diagnosis   (none) - all problems resolved or deleted         Discharge Diagnosis:  section at 39w0d    Procedures:  , Low Transverse     8/15/2017    11:06 AM          Discharge Date: 2017; Discharge Time: 8:48 AM    Hospital Course  Patient is a 36 y.o. female  at 39w0d status post  section with uneventful postoperative recovery.  Patient was advanced to regular diet on postoperative day#1.  On discharge, ambulating, tolerating a regular diet without any difficulties and her incision is dry, clean and intact.     Infant:   female  fetus 6 lb 14.2 oz (3.125 kg)  with Apgar scores of 8  , 9   at five minutes.    Condition on Discharge:  Stable    Vital Signs  Temp:  [97.6 °F (36.4 °C)-98.7 °F (37.1 °C)] 98.2 °F (36.8 °C)  Heart Rate:  [67-74] 74  Resp:  [14-18] 14  BP: (119-134)/(66-78) 122/75    Lab Results   Component Value Date    WBC 7.95 2017    HGB 11.0 (L) 2017    HCT 32.1 (L) 2017    MCV 91.7 2017     (L) 2017       Discharge Disposition  Home or Self Care    Discharge Medications   Jos Zuleta   Home Medication Instructions ZULAY:[id number]    Printed on:17 0847   Medication Information                      ibuprofen (ADVIL,MOTRIN) 800 MG tablet  Take 1 tablet by mouth Every 8 (Eight) Hours As Needed for Mild Pain .             oxyCODONE-acetaminophen (PERCOCET) 5-325 MG per tablet  Take 1-2 tablets by mouth Every 4 (Four) Hours As Needed for Moderate Pain  for up to 3 days.             Prenatal Vit-Fe Fumarate-FA (PREPLUS) 27-1 MG tablet  Take 1 tablet by mouth Daily.                 Discharge Diet:     Activity at Discharge:   Activity  Instructions     Discharge Activity Restrictions       1) No driving for 2 weeks from delivery and no longer taking narcotics.   2) Do not lift / push / pull more then 10 lbs.       Pelvic Rest       For 6 weeks                  Follow-up Appointments  Future Appointments  Date Time Provider Department Center   9/6/2017 8:30 AM Shiloh Hinton MD [facility] None     Additional Instructions for the Follow-ups that You Need to Schedule     Call MD for problems / concerns.    As directed    Please call with concerns of depression.       Clorhexidine skin prep    As directed    Educate and review protocol with patient.       Discharge Follow-up with Specified Provider    As directed    To:  dr knapp   Follow Up:  2 Weeks       Follow-Up    As directed    with primary care obstetrician     Follow Up Details:  in 2 weeks from delivery       Obtain informed consent    As directed    Informed Consent Given For:  C/S                 Svetlana Jackson, LINDA  08/18/17  8:47 AM  [facility]

## 2017-08-18 NOTE — PLAN OF CARE
Problem: Patient Care Overview (Adult)  Goal: Plan of Care Review  Outcome: Outcome(s) achieved Date Met:  17 0754   Coping/Psychosocial Response Interventions   Plan Of Care Reviewed With patient   Patient Care Overview   Progress improving       Goal: Adult Individualization and Mutuality  Outcome: Outcome(s) achieved Date Met:  17  Goal: Discharge Needs Assessment  Outcome: Outcome(s) achieved Date Met:  17    Problem: Postpartum ( Delivery) (Adult)  Goal: Signs and Symptoms of Listed Potential Problems Will be Absent or Manageable (Postpartum)  Outcome: Outcome(s) achieved Date Met:  17

## 2017-09-06 ENCOUNTER — OFFICE VISIT (OUTPATIENT)
Dept: INTERNAL MEDICINE | Facility: CLINIC | Age: 37
End: 2017-09-06

## 2017-09-06 VITALS
DIASTOLIC BLOOD PRESSURE: 74 MMHG | BODY MASS INDEX: 24.55 KG/M2 | RESPIRATION RATE: 18 BRPM | SYSTOLIC BLOOD PRESSURE: 112 MMHG | HEIGHT: 68 IN | WEIGHT: 162 LBS | HEART RATE: 72 BPM | OXYGEN SATURATION: 100 %

## 2017-09-06 DIAGNOSIS — I87.2 VENOUS INSUFFICIENCY OF BOTH LOWER EXTREMITIES: ICD-10-CM

## 2017-09-06 DIAGNOSIS — G43.909 MIGRAINE SYNDROME: Primary | ICD-10-CM

## 2017-09-06 PROCEDURE — 99213 OFFICE O/P EST LOW 20 MIN: CPT | Performed by: FAMILY MEDICINE

## 2017-09-06 RX ORDER — ZOLMITRIPTAN 5 MG/1
TABLET, FILM COATED ORAL
Qty: 9 TABLET | Refills: 11 | Status: SHIPPED | OUTPATIENT
Start: 2017-09-06 | End: 2018-09-06

## 2017-09-06 NOTE — PROGRESS NOTES
"Subjective   Jos Zuleta is a 36 y.o. female.     History of Present Illness   Here today for first post partum check. Pt post C section 8/15/17. Last routine 5/16/17. Was seen 2/3/17 for sinus infection. Was 11 wk pregnant at that time. Last routine 5/16/16 for migraine, on zomig. Off meds during pregnancy.     1.CV- varicose veins. First discussed during pregnancy with pt to use support hose; possible vascular referral now. Today pt reports she would like to proceed now as she is still having pain.     2.NEURO- migraine- pt off zomig while pregnant. Today she reports she is up a lot feeding so is not sleeping much. Is not nursing. Is getting HA again now.     The following portions of the patient's history were reviewed and updated as appropriate: allergies, past family history, past medical history, past social history, past surgical history and problem list.    Review of Systems   Cardiovascular: Negative for chest pain.   Gastrointestinal: Negative for abdominal distention and abdominal pain.   Skin: Negative for color change.   Neurological: Negative for tremors, speech difficulty and headaches.   Psychiatric/Behavioral: Negative for agitation and confusion.   All other systems reviewed and are negative.        Current Outpatient Prescriptions:   •  Prenatal Vit-Fe Fumarate-FA (PREPLUS) 27-1 MG tablet, Take 1 tablet by mouth Daily., Disp: , Rfl:   •  ZOLMitriptan (ZOMIG) 5 MG tablet, Take 1 tab po prn migraine. May repeat at 2hr. Max 2 in 24 hr., Disp: 9 tablet, Rfl: 11    Objective     /74  Pulse 72  Resp 18  Ht 68\" (172.7 cm)  Wt 162 lb (73.5 kg)  SpO2 100%  BMI 24.63 kg/m2    Physical Exam   Constitutional: She is oriented to person, place, and time. She appears well-developed and well-nourished.   HENT:   Right Ear: Tympanic membrane and ear canal normal.   Left Ear: Tympanic membrane and ear canal normal.   Mouth/Throat: Oropharynx is clear and moist.   Eyes: Conjunctivae and EOM are " normal. Pupils are equal, round, and reactive to light.   Neck: No thyromegaly present.   Cardiovascular: Normal rate and regular rhythm.    Pulmonary/Chest: Effort normal and breath sounds normal.   Neurological: She is alert and oriented to person, place, and time.   Skin: Skin is warm and dry.   Psychiatric: She has a normal mood and affect.   Vitals reviewed.      Assessment/Plan   Jos was seen today for migraine.    Diagnoses and all orders for this visit:    Migraine syndrome  -     ZOLMitriptan (ZOMIG) 5 MG tablet; Take 1 tab po prn migraine. May repeat at 2hr. Max 2 in 24 hr.    Venous insufficiency of both lower extremities  -     Ambulatory Referral to Cardiology      1. NEURO- migraine. Will restart zomig now. Discussed that if she starts to have more frequent headache, despite the zomig, to come in.  2. CV- venous insufficiency. Will refer to CV now as medically indicated.  3. RECHECK- 1yr routine

## 2017-09-06 NOTE — PATIENT INSTRUCTIONS
1. NEURO- migraine. Will restart zomig now. Discussed that if she starts to have more frequent headache, despite the zomig, to come in.  2. CV- venous insufficiency. Will refer to CV now as medically indicated.  3. RECHECK- 1yr routine

## 2018-09-06 ENCOUNTER — OFFICE VISIT (OUTPATIENT)
Dept: INTERNAL MEDICINE | Facility: CLINIC | Age: 38
End: 2018-09-06

## 2018-09-06 VITALS
WEIGHT: 122.6 LBS | SYSTOLIC BLOOD PRESSURE: 110 MMHG | DIASTOLIC BLOOD PRESSURE: 70 MMHG | HEIGHT: 68 IN | BODY MASS INDEX: 18.58 KG/M2 | TEMPERATURE: 99 F

## 2018-09-06 DIAGNOSIS — G43.909 MIGRAINE SYNDROME: Primary | ICD-10-CM

## 2018-09-06 DIAGNOSIS — B96.89 ACUTE BACTERIAL SINUSITIS: ICD-10-CM

## 2018-09-06 DIAGNOSIS — J01.90 ACUTE BACTERIAL SINUSITIS: ICD-10-CM

## 2018-09-06 DIAGNOSIS — I83.893 SYMPTOMATIC VARICOSE VEINS, BILATERAL: ICD-10-CM

## 2018-09-06 PROCEDURE — 99213 OFFICE O/P EST LOW 20 MIN: CPT | Performed by: FAMILY MEDICINE

## 2018-09-06 RX ORDER — ZOLMITRIPTAN 5 MG/1
5 TABLET, ORALLY DISINTEGRATING ORAL ONCE AS NEEDED
Qty: 9 TABLET | Refills: 11 | Status: SHIPPED | OUTPATIENT
Start: 2018-09-06 | End: 2019-09-09 | Stop reason: SDUPTHER

## 2018-09-06 RX ORDER — VITAMIN A, ASCORBIC ACID, CHOLECALCIFEROL, TOCOPHEROL, THIAMINE MONONITRATE, RIBOFLAVIN, PYRIDOXINE, FOLIC ACID, CYANOCOBALAMIN, CALCIUM CARBONATE, FERROUS FUMARATE, ZINC OXIDE, CUPRIC OXIDE, NIACINAMIDE, AND FISH OIL 27-1-250MG
KIT ORAL
Refills: 10 | COMMUNITY
Start: 2018-07-10 | End: 2020-09-09 | Stop reason: SDUPTHER

## 2018-09-06 NOTE — PROGRESS NOTES
Subjective   Jos Zuleta is a 37 y.o. female.     History of Present Illness   Here for routine annual. Last seen 9/6/17 for first post partum check, post C section 8/15/17. Last routine 5/16/17. Was seen 2/3/17 for sinus infection. Was 11 wk pregnant at that time. Last routine 5/16/16 for migraine, on zomig. Off meds during pregnancy.     1.CV- varicose veins. First discussed during pregnancy with pt to use support hose until post partum. Was referred to vascular surgeon post partum.  Today pt reports she had to cancel this due to her baby being ill.     2.NEURO- migraine- pt off zomig while pregnant. Was starting to get HA again post partum, especially related to missed sleep from nighttime feedings of baby. Was restarted on prn zomig. Today she reports she did not get many migraines until recently. Has a persistent one currently for 3 days. Has had intermittently yellow nasal discharge.    The following portions of the patient's history were reviewed and updated as appropriate: current medications, past family history, past medical history, past social history, past surgical history and problem list.    Review of Systems   Cardiovascular: Negative for chest pain.   Gastrointestinal: Negative for abdominal distention and abdominal pain.   Skin: Negative for color change.   Neurological: Negative for tremors, speech difficulty and headaches.   Psychiatric/Behavioral: Negative for agitation and confusion.   All other systems reviewed and are negative.        Current Outpatient Prescriptions:   •  clarithromycin XL (BIAXIN XL) 500 MG 24 hr tablet, Take 2 tablets by mouth Daily., Disp: 14 tablet, Rfl: 0  •  Prenatal Vit-Fe Fum-FA-Omega (PNV PRENATAL PLUS MULTIVIT+DHA) 27-1 & 312 MG misc, Take 1 tablet and 1 capsule by mouth daily as directed, Disp: , Rfl: 10  •  ZOLMitriptan (ZOMIG-ZMT) 5 MG disintegrating tablet, Take 1 tablet by mouth 1 (One) Time As Needed for Migraine for up to 1 dose., Disp: 9 tablet, Rfl:  "11    Objective     /70   Temp 99 °F (37.2 °C)   Ht 172.7 cm (68\")   Wt 55.6 kg (122 lb 9.6 oz)   BMI 18.64 kg/m²     Physical Exam   Constitutional: She is oriented to person, place, and time. She appears well-developed and well-nourished.   HENT:   Right Ear: Tympanic membrane and ear canal normal.   Left Ear: Tympanic membrane and ear canal normal.   Mouth/Throat: Oropharynx is clear and moist.   Eyes: Pupils are equal, round, and reactive to light. Conjunctivae and EOM are normal.   Neck: No thyromegaly present.   Cardiovascular: Normal rate and regular rhythm.    Pulmonary/Chest: Effort normal and breath sounds normal.   Neurological: She is alert and oriented to person, place, and time.   Skin: Skin is warm and dry.   Psychiatric: She has a normal mood and affect.   Vitals reviewed.      Assessment/Plan   Jos was seen today for follow-up.    Diagnoses and all orders for this visit:    Migraine syndrome  -     ZOLMitriptan (ZOMIG-ZMT) 5 MG disintegrating tablet; Take 1 tablet by mouth 1 (One) Time As Needed for Migraine for up to 1 dose.    Acute bacterial sinusitis  -     clarithromycin XL (BIAXIN XL) 500 MG 24 hr tablet; Take 2 tablets by mouth Daily.    Symptomatic varicose veins, bilateral  -     Ambulatory Referral to Vascular Surgery        1. NEURO- migraine- stable other than currently, likely related to sinus infection. RF zomig.  2. RESP- sinusitis. Will treat with biaxin.   3. CV- symptomatic varicose veins. Will refer to vascular surgeon now.  4. RECHECK- 1yr       "

## 2018-09-06 NOTE — PATIENT INSTRUCTIONS
1. NEURO- migraine- stable other than currently, likely related to sinus infection. RF zomig.  2. RESP- sinusitis. Will treat with biaxin.   3. CV- symptomatic varicose veins. Will refer to vascular surgeon now.  4. RECHECK- 1yr

## 2019-01-21 ENCOUNTER — OFFICE VISIT (OUTPATIENT)
Dept: INTERNAL MEDICINE | Facility: CLINIC | Age: 39
End: 2019-01-21

## 2019-01-21 VITALS — WEIGHT: 120 LBS | BODY MASS INDEX: 18.19 KG/M2 | HEIGHT: 68 IN | TEMPERATURE: 97.3 F

## 2019-01-21 DIAGNOSIS — B35.1 TOENAIL FUNGUS: Primary | ICD-10-CM

## 2019-01-21 DIAGNOSIS — K52.9 GASTROENTERITIS: ICD-10-CM

## 2019-01-21 PROCEDURE — 99213 OFFICE O/P EST LOW 20 MIN: CPT | Performed by: FAMILY MEDICINE

## 2019-01-21 RX ORDER — DICYCLOMINE HYDROCHLORIDE 10 MG/1
CAPSULE ORAL
Qty: 10 CAPSULE | Refills: 0 | Status: SHIPPED | OUTPATIENT
Start: 2019-01-21 | End: 2019-08-30

## 2019-01-21 NOTE — PROGRESS NOTES
"Subjective   Jos Zuleta is a 38 y.o. female.     History of Present Illness   Here today as a work in for left great toe wound. Last seen 9/6/18 for routine.     DERM/ ORTHO- today pt reports that 6yr ago she injured her left great toenail and it fell off but then grew back. Then a year and a half ago it stopped growing at all again. Now it is getting thick and discolored with pain with pressing on it.     GI- gastroenteritis. Family has had this and pt was sick for 3 days and is still having issues with loose stools.    The following portions of the patient's history were reviewed and updated as appropriate: current medications, past family history, past medical history, past social history, past surgical history and problem list.    Review of Systems   Cardiovascular: Negative for chest pain.   Gastrointestinal: Negative for abdominal distention and abdominal pain.   Skin: Positive for wound (great left toe). Negative for color change.   Neurological: Negative for tremors, speech difficulty and headaches.   Psychiatric/Behavioral: Negative for agitation and confusion.   All other systems reviewed and are negative.        Current Outpatient Medications:   •  dicyclomine (BENTYL) 10 MG capsule, Change 1 cap po q8hr prn diarrhea, Disp: 10 capsule, Rfl: 0  •  Prenatal Vit-Fe Fum-FA-Omega (PNV PRENATAL PLUS MULTIVIT+DHA) 27-1 & 312 MG misc, Take 1 tablet and 1 capsule by mouth daily as directed, Disp: , Rfl: 10  •  ZOLMitriptan (ZOMIG-ZMT) 5 MG disintegrating tablet, Take 1 tablet by mouth 1 (One) Time As Needed for Migraine for up to 1 dose., Disp: 9 tablet, Rfl: 11    Objective     Temp 97.3 °F (36.3 °C)   Ht 172.7 cm (68\")   Wt 54.4 kg (120 lb)   BMI 18.25 kg/m²     Physical Exam   Constitutional: She is oriented to person, place, and time. She appears well-developed and well-nourished.   HENT:   Right Ear: Tympanic membrane and ear canal normal.   Left Ear: Tympanic membrane and ear canal normal. "   Mouth/Throat: Oropharynx is clear and moist.   Eyes: Conjunctivae and EOM are normal. Pupils are equal, round, and reactive to light.   Neck: No thyromegaly present.   Cardiovascular: Normal rate and regular rhythm.   Pulmonary/Chest: Effort normal and breath sounds normal.   Neurological: She is alert and oriented to person, place, and time.   Skin: Skin is warm and dry.   Thickened yellow right toenail, full nail involvement   Psychiatric: She has a normal mood and affect.   Vitals reviewed.      Assessment/Plan   Jos was seen today for toe injury.    Diagnoses and all orders for this visit:    Toenail fungus    Gastroenteritis  -     dicyclomine (BENTYL) 10 MG capsule; Change 1 cap po q8hr prn diarrhea        1. DERM- toenail fungus. Discussed podiatry referral for toenail removal vs oral lamisil vs trial of 2mo of daily epsom salt soaks (20 min daily). Pt will try the soaks first but can call for a podiatry referral if needed.  2. RECHECK- prn

## 2019-01-21 NOTE — PATIENT INSTRUCTIONS
1. DERM- toenail fungus. Discussed podiatry referral for toenail removal vs oral lamisil vs trial of 2mo of daily epsom salt soaks (20 min daily). Pt will try the soaks first but can call for a podiatry referral if needed.  2. GI- gastroenteritis- will treat with low dose bentyl.  3. RECHECK- change CPE 9/2019 to Dr Quezada

## 2019-04-08 ENCOUNTER — TELEPHONE (OUTPATIENT)
Dept: INTERNAL MEDICINE | Facility: CLINIC | Age: 39
End: 2019-04-08

## 2019-04-08 RX ORDER — CLARITHROMYCIN 500 MG/1
500 TABLET, COATED ORAL 2 TIMES DAILY
Qty: 14 TABLET | Refills: 0 | Status: SHIPPED | OUTPATIENT
Start: 2019-04-08 | End: 2019-04-15

## 2019-08-30 ENCOUNTER — OFFICE VISIT (OUTPATIENT)
Dept: INTERNAL MEDICINE | Facility: CLINIC | Age: 39
End: 2019-08-30

## 2019-08-30 VITALS
BODY MASS INDEX: 19.1 KG/M2 | DIASTOLIC BLOOD PRESSURE: 67 MMHG | HEIGHT: 68 IN | TEMPERATURE: 100.2 F | RESPIRATION RATE: 14 BRPM | OXYGEN SATURATION: 98 % | SYSTOLIC BLOOD PRESSURE: 104 MMHG | WEIGHT: 126 LBS | HEART RATE: 83 BPM

## 2019-08-30 DIAGNOSIS — J31.0 RHINOSINUSITIS: Primary | ICD-10-CM

## 2019-08-30 DIAGNOSIS — J32.9 RHINOSINUSITIS: Primary | ICD-10-CM

## 2019-08-30 LAB
EXPIRATION DATE: NORMAL
FLUAV AG NPH QL: NEGATIVE
FLUBV AG NPH QL: NEGATIVE
INTERNAL CONTROL: NORMAL
Lab: NORMAL

## 2019-08-30 PROCEDURE — 87804 INFLUENZA ASSAY W/OPTIC: CPT | Performed by: NURSE PRACTITIONER

## 2019-08-30 PROCEDURE — 99213 OFFICE O/P EST LOW 20 MIN: CPT | Performed by: NURSE PRACTITIONER

## 2019-08-30 RX ORDER — PSEUDOEPHEDRINE HCL 120 MG/1
120 TABLET, FILM COATED, EXTENDED RELEASE ORAL EVERY 12 HOURS
Qty: 8 TABLET | Refills: 0 | Status: SHIPPED | OUTPATIENT
Start: 2019-08-30 | End: 2019-09-09

## 2019-08-30 NOTE — PROGRESS NOTES
Subjective   Jos Zuleta is a 38 y.o. female here today for Sinus pressure.    Chief Complaint   Patient presents with   • Sinus Problem   Jos report she has been having sinus congestion and cough for about a month and yesterday she felt suddenly worse. She is having having rhinorrhea, sneezing fits, feeling feverish, and worsening congestion- cannot smell.  She has been taking bendryl.  She denies ear pain- there is pressure.  She denies sore throat.  She does have 2 young children in school and childcare.      Review of Systems   Constitutional: Positive for fatigue and fever. Negative for activity change, appetite change and chills.   HENT: Positive for congestion, rhinorrhea, sinus pressure and sneezing. Negative for ear discharge, ear pain, postnasal drip, sore throat, swollen glands and voice change.    Eyes: Negative for photophobia, redness and itching.   Respiratory: Positive for cough. Negative for shortness of breath and wheezing.    Cardiovascular: Negative for chest pain and leg swelling.   Musculoskeletal: Negative for arthralgias and myalgias.   Skin: Negative for color change and rash.   Allergic/Immunologic: Negative for environmental allergies and immunocompromised state.   Neurological: Positive for headache. Negative for dizziness and weakness.   Hematological: Negative for adenopathy. Does not bruise/bleed easily.       Past Medical History:   Diagnosis Date   • Migraine    • Pregnancy, incidental     Dr Maryellen Rodriguez     Family History   Problem Relation Age of Onset   • Stroke Mother    • Migraines Mother    • Stroke Maternal Grandmother    • Migraines Maternal Grandmother    • Heart attack Maternal Grandmother    • Cancer Paternal Grandmother    • Stroke Paternal Grandmother    • Migraines Paternal Grandmother    • Heart attack Paternal Grandmother    • Stroke Other      Past Surgical History:   Procedure Laterality Date   •  SECTION     •  SECTION N/A 8/15/2017     "Procedure:  SECTION REPEAT;  Surgeon: Pearl Smith MD;  Location: [facility] LABOR DELIVERY;  Service:    • WISDOM TOOTH EXTRACTION       Social History     Socioeconomic History   • Marital status:      Spouse name: Not on file   • Number of children: Not on file   • Years of education: Not on file   • Highest education level: Not on file   Tobacco Use   • Smoking status: Never Smoker   • Smokeless tobacco: Never Used   Substance and Sexual Activity   • Alcohol use: No   • Drug use: No   • Sexual activity: Yes     Partners: Male         Current Outpatient Medications:   •  Prenatal Vit-Fe Fum-FA-Omega (PNV PRENATAL PLUS MULTIVIT+DHA) 27-1 & 312 MG misc, Take 1 tablet and 1 capsule by mouth daily as directed, Disp: , Rfl: 10  •  ZOLMitriptan (ZOMIG-ZMT) 5 MG disintegrating tablet, Take 1 tablet by mouth 1 (One) Time As Needed for Migraine for up to 1 dose., Disp: 9 tablet, Rfl: 11  •  pseudoephedrine (SUDAFED 12 HOUR) 120 MG 12 hr tablet, Take 1 tablet by mouth Every 12 (Twelve) Hours., Disp: 8 tablet, Rfl: 0    Objective   Vitals:    19 1048   BP: 104/67   Pulse: 83   Resp: 14   Temp: 100.2 °F (37.9 °C)   TempSrc: Temporal   SpO2: 98%   Weight: 57.2 kg (126 lb)   Height: 172.7 cm (68\")     Body mass index is 19.16 kg/m².  Physical Exam   Constitutional: She has a sickly appearance. No distress.   HENT:   Right Ear: Tympanic membrane and external ear normal.   Left Ear: Tympanic membrane and external ear normal.   Nose: No mucosal edema or rhinorrhea. Right sinus exhibits no maxillary sinus tenderness and no frontal sinus tenderness. Left sinus exhibits no maxillary sinus tenderness and no frontal sinus tenderness.   Mouth/Throat: Oropharynx is clear and moist and mucous membranes are normal. No oropharyngeal exudate, posterior oropharyngeal edema, posterior oropharyngeal erythema or tonsillar abscesses. No tonsillar exudate.   Eyes: Right eye exhibits no discharge. Left eye exhibits no discharge. " Right conjunctiva is not injected. Left conjunctiva is not injected. No scleral icterus.   Neck: No neck rigidity. No thyromegaly present.   Cardiovascular: Normal rate and regular rhythm.   Pulmonary/Chest: Effort normal and breath sounds normal.   Lymphadenopathy:        Head (right side): No submental, no submandibular, no tonsillar, no preauricular, no posterior auricular and no occipital adenopathy present.        Head (left side): No submental, no submandibular, no tonsillar, no preauricular, no posterior auricular and no occipital adenopathy present.     She has no cervical adenopathy.        Right cervical: No superficial cervical, no deep cervical and no posterior cervical adenopathy present.       Left cervical: No superficial cervical, no deep cervical and no posterior cervical adenopathy present.   Neurological: She is alert.   Skin: Skin is warm, dry and intact. Capillary refill takes 2 to 3 seconds. She is not diaphoretic. No pallor.   Psychiatric: She has a normal mood and affect. Her speech is normal and behavior is normal. Cognition and memory are normal.   Nursing note and vitals reviewed.      Assessment/Plan   Problem List Items Addressed This Visit     None      Visit Diagnoses     Rhinosinusitis    -  Primary    Relevant Medications    pseudoephedrine (SUDAFED 12 HOUR) 120 MG 12 hr tablet    Other Relevant Orders    POCT Influenza A/B        Jos was seen today for sinus problem.    Diagnoses and all orders for this visit:    Rhinosinusitis  -     pseudoephedrine (SUDAFED 12 HOUR) 120 MG 12 hr tablet; Take 1 tablet by mouth Every 12 (Twelve) Hours.  -     POCT Influenza A/B    Flu negative today, recommend Tylenol and ibuprofen for fever and headache.  Recommend Sudafed for sinus congestion.  Discussed typical length of rhinosinusitis and when to expect improvement.  Discussed calling return to clinic with any new, or worsening symptoms or lack of improvement.         The patient was counseled  regarding diagnostic results, impressions, prognosis, instructions for management, risk factor reductions, education, and importance of treatment compliance.  The patient verbalized understanding of and agreement with the plan of care.    Advised patient to call with any further questions and any new or worsening symptoms.     Return if symptoms worsen or fail to improve.      LINDA Pop    Please note that portions of this note were completed with a voice recognition program. Efforts were made to edit the dictations, but occasionally words are mistranscribed.

## 2019-09-09 ENCOUNTER — OFFICE VISIT (OUTPATIENT)
Dept: INTERNAL MEDICINE | Facility: CLINIC | Age: 39
End: 2019-09-09

## 2019-09-09 VITALS
DIASTOLIC BLOOD PRESSURE: 70 MMHG | TEMPERATURE: 99.9 F | HEIGHT: 68 IN | SYSTOLIC BLOOD PRESSURE: 122 MMHG | BODY MASS INDEX: 18.88 KG/M2 | WEIGHT: 124.6 LBS

## 2019-09-09 DIAGNOSIS — I83.893 SYMPTOMATIC VARICOSE VEINS, BILATERAL: ICD-10-CM

## 2019-09-09 DIAGNOSIS — G43.909 MIGRAINE SYNDROME: Primary | ICD-10-CM

## 2019-09-09 PROCEDURE — 99213 OFFICE O/P EST LOW 20 MIN: CPT | Performed by: INTERNAL MEDICINE

## 2019-09-09 RX ORDER — ZOLMITRIPTAN 5 MG/1
5 TABLET, ORALLY DISINTEGRATING ORAL ONCE AS NEEDED
Qty: 9 TABLET | Refills: 11 | Status: SHIPPED | OUTPATIENT
Start: 2019-09-09 | End: 2020-09-09 | Stop reason: SDUPTHER

## 2019-09-09 NOTE — PROGRESS NOTES
"Subjective   Jos Zuleta is a 38 y.o. female here for follow-up migraines and varicose veins. Migraines: not on ppx med, uses zomig PRN. Needs refill. Migraines usually are whole head, pounding. She also had a procedure recently for symptomatic varicose veins. She had excellent response to laser therapy and was advised to wear compression stockings daily. No other issues today. Has a 2 and 7yo children.      I have reviewed the following portions of the patient's history and confirmed they are accurate: allergies, current medications, past social history, past surgical history and problem list     I have personally completed the patient's review of systems.    Review of Systems:  General: negative  CV: negative  Neuro: HA    Objective   /70 (BP Location: Left arm, Patient Position: Sitting, Cuff Size: Adult)   Temp 99.9 °F (37.7 °C) (Temporal)   Ht 172.7 cm (68\")   Wt 56.5 kg (124 lb 9.6 oz)   LMP 08/21/2019   BMI 18.95 kg/m²     Physical Exam   Constitutional: She is oriented to person, place, and time. She appears well-developed and well-nourished.   Cardiovascular: Normal rate, regular rhythm and normal heart sounds.   Pulmonary/Chest: Effort normal and breath sounds normal. She has no wheezes. She has no rales.   Neurological: She is alert and oriented to person, place, and time.   Skin: Skin is warm and dry.   Psychiatric: She has a normal mood and affect. Her behavior is normal. Thought content normal.   Vitals reviewed.      Assessment/Plan   Jos was seen today for migraine.    Diagnoses and all orders for this visit:    Migraine syndrome  -     ZOLMitriptan (ZOMIG-ZMT) 5 MG disintegrating tablet; Take 1 tablet by mouth 1 (One) Time As Needed for Migraine for up to 1 dose.  -chronic with flares    Symptomatic varicose veins, bilateral  -s/p therapy, excellent response  -continue compression stockings PRN           Melina Donato MA    "

## 2020-09-09 ENCOUNTER — OFFICE VISIT (OUTPATIENT)
Dept: INTERNAL MEDICINE | Facility: CLINIC | Age: 40
End: 2020-09-09

## 2020-09-09 VITALS
DIASTOLIC BLOOD PRESSURE: 70 MMHG | TEMPERATURE: 96.8 F | BODY MASS INDEX: 18.63 KG/M2 | HEIGHT: 69 IN | SYSTOLIC BLOOD PRESSURE: 122 MMHG | WEIGHT: 125.8 LBS

## 2020-09-09 DIAGNOSIS — G43.909 MIGRAINE SYNDROME: Primary | ICD-10-CM

## 2020-09-09 DIAGNOSIS — Z23 NEED FOR INFLUENZA VACCINATION: ICD-10-CM

## 2020-09-09 DIAGNOSIS — L60.8 NAIL DEFORMITY: ICD-10-CM

## 2020-09-09 PROCEDURE — 90471 IMMUNIZATION ADMIN: CPT | Performed by: INTERNAL MEDICINE

## 2020-09-09 PROCEDURE — 90686 IIV4 VACC NO PRSV 0.5 ML IM: CPT | Performed by: INTERNAL MEDICINE

## 2020-09-09 PROCEDURE — 99213 OFFICE O/P EST LOW 20 MIN: CPT | Performed by: INTERNAL MEDICINE

## 2020-09-09 RX ORDER — VITAMIN A, ASCORBIC ACID, CHOLECALCIFEROL, TOCOPHEROL, THIAMINE MONONITRATE, RIBOFLAVIN, PYRIDOXINE, FOLIC ACID, CYANOCOBALAMIN, CALCIUM CARBONATE, FERROUS FUMARATE, ZINC OXIDE, CUPRIC OXIDE, NIACINAMIDE, AND FISH OIL 27-1-250MG
1 KIT ORAL DAILY
Qty: 90 EACH | Refills: 2 | Status: SHIPPED | OUTPATIENT
Start: 2020-09-09 | End: 2021-05-18

## 2020-09-09 RX ORDER — ZOLMITRIPTAN 5 MG/1
5 TABLET, ORALLY DISINTEGRATING ORAL ONCE AS NEEDED
Qty: 9 TABLET | Refills: 11 | Status: SHIPPED | OUTPATIENT
Start: 2020-09-09 | End: 2021-09-09 | Stop reason: SDUPTHER

## 2020-09-09 NOTE — PROGRESS NOTES
"Subjective   Jos Zuleta is a 39 y.o. female here for follow-up migraines, nail deformity.  Migraines: Takes Zomig 1-2 times per month.  Medication works well so she likes to keep it on hand.  She denies any issues with the medication.  She has noticed a nail deformity of the great toenails.  In the corner of the top of each nail it is white and thickened and can peel easily.  She thought maybe it was her shoes causing the issue.  She did previously have fungus but it did not look like this at all. Her toenails are currently painted blue. There is no pain.  She also wants to know should she be taking her prenatal vitamin.  She does have an appointment for a Pap smear in November.  She is not trying to prevent pregnancy and would be okay if she became pregnant again.    I have reviewed the following portions of the patient's history and confirmed they are accurate: current medications, past medical history, past social history and problem list     I have personally completed the patient's review of systems.    Review of Systems:  General: negative  CV: negative  Respiratory: negative  Neuro: Headaches  Skin: Nail deformity    Objective   /70 (BP Location: Left arm, Patient Position: Sitting, Cuff Size: Adult)   Temp 96.8 °F (36 °C) (Temporal)   Ht 175.3 cm (69\")   Wt 57.1 kg (125 lb 12.8 oz)   BMI 18.58 kg/m²     Physical Exam   Constitutional: She is oriented to person, place, and time. She appears well-developed and well-nourished.   Pulmonary/Chest: Effort normal.   Neurological: She is alert and oriented to person, place, and time.   Skin: Skin is warm and dry.   Psychiatric: She has a normal mood and affect. Her behavior is normal. Judgment and thought content normal.   Vitals reviewed.      Assessment/Plan   Jos was seen today for follow-up.    Diagnoses and all orders for this visit:    Migraine syndrome  -     ZOLMitriptan (ZOMIG-ZMT) 5 MG disintegrating tablet; Place 1 tablet on the tongue 1 " (One) Time As Needed for Migraine for up to 1 dose.  -Stable with flares, continue Zomig as needed    Need for influenza vaccination  -     FluLaval Quad >6 Months ([id number])    Nail deformity  -Suspect external cause such as shoes or possible nailbed deformity as cause.  Not typical of fugus    Other orders  -     Prenatal Vit-Fe Fum-FA-Omega (PNV PRENATAL PLUS MULTIVIT+DHA) 27-1 & 312 MG misc; Take 1 tablet by mouth Daily.  Did recommend daily hydration as she is trying to prevent pregnancy             Melina Donato MA    Please note that portions of this note were completed with a voice recognition program. Efforts were made to edit the dictations, but occasionally words are mistranscribed.

## 2020-11-05 ENCOUNTER — OFFICE VISIT (OUTPATIENT)
Dept: OBSTETRICS AND GYNECOLOGY | Facility: CLINIC | Age: 40
End: 2020-11-05

## 2020-11-05 VITALS
DIASTOLIC BLOOD PRESSURE: 89 MMHG | BODY MASS INDEX: 18.22 KG/M2 | WEIGHT: 123 LBS | HEIGHT: 69 IN | SYSTOLIC BLOOD PRESSURE: 131 MMHG

## 2020-11-05 DIAGNOSIS — Z01.419 WOMEN'S ANNUAL ROUTINE GYNECOLOGICAL EXAMINATION: Primary | ICD-10-CM

## 2020-11-05 PROCEDURE — 99395 PREV VISIT EST AGE 18-39: CPT | Performed by: OBSTETRICS & GYNECOLOGY

## 2020-11-05 NOTE — PROGRESS NOTES
GYN Annual Exam     CC - Here for annual exam.        HPI  Jos Zuleta is a 39 y.o. female, , who presents for annual well woman exam. Patient's last menstrual period was 10/24/2020..  Periods are regular every 25-35 days, lasting 6 days. .  Dysmenorrhea:mild, occurring first 1-2 days of flow.  Patient reports problems with: none.  There were no changes to her medical or surgical history since her last visit.. Partner Status: Marital Status: .  New Partners since last visit: no.  Desires STD Screening: no.    Additional OB/GYN History   Current contraception: contraceptive methods: None  Desires to: do not start contraception  Last Pap :   Last Completed Pap Smear       Status Date      PAP SMEAR Done 10/10/2019 negative per antony     Patient has more history with this topic...        History of abnormal Pap smear: no  Family history of uterine, colon, breast, or ovarian cancer: yes - Paternal grandmother and maternal aunt had ovarian cancer.  Performs monthly Self-Breast Exam: yes  Exercises Regularly:yes  Feelings of Anxiety or Depression: no  Tobacco Usage?: No   OB History        2    Para   1    Term   1            AB        Living   2       SAB        TAB        Ectopic        Molar        Multiple   0    Live Births   2                Health Maintenance   Topic Date Due   • Annual Gynecologic Pelvic and Breast Exam  1981   • ANNUAL PHYSICAL  1984   • TDAP/TD VACCINES (1 - Tdap) 2000   • HEPATITIS C SCREENING  2016   • PAP SMEAR  10/10/2022   • INFLUENZA VACCINE  Completed   • Pneumococcal Vaccine 0-64  Aged Out       The additional following portions of the patient's history were reviewed and updated as appropriate: allergies, current medications, past family history, past medical history, past social history, past surgical history and problem list.    Review of Systems   Constitutional: Negative.    HENT: Negative.    Eyes: Negative.    Respiratory:  "Negative.    Cardiovascular: Negative.    Gastrointestinal: Negative.    Endocrine: Negative.    Genitourinary: Negative.    Musculoskeletal: Negative.    Allergic/Immunologic: Negative.    Neurological: Negative.    Hematological: Negative.    Psychiatric/Behavioral: Negative.      All other systems reviewed and are negative.     I have reviewed and agree with the HPI, ROS, and historical information as entered above. Pearl Smith MD    Objective   /89   Ht 175.3 cm (69\")   Wt 55.8 kg (123 lb)   LMP 10/24/2020   Breastfeeding No   BMI 18.16 kg/m²     Physical Exam  Vitals signs and nursing note reviewed. Exam conducted with a chaperone present.   Constitutional:       Appearance: She is well-developed.   HENT:      Head: Normocephalic and atraumatic.   Neck:      Musculoskeletal: Normal range of motion. No muscular tenderness.      Thyroid: No thyroid mass or thyromegaly.   Pulmonary:      Effort: Pulmonary effort is normal. No retractions.   Chest:      Chest wall: No mass.      Breasts:         Right: Normal. No mass, nipple discharge, skin change or tenderness.         Left: Normal. No mass, nipple discharge, skin change or tenderness.   Abdominal:      General: Bowel sounds are normal.      Palpations: Abdomen is soft. Abdomen is not rigid. There is no mass.      Tenderness: There is no abdominal tenderness. There is no guarding.      Hernia: No hernia is present. There is no hernia in the left inguinal area or right inguinal area.   Genitourinary:     Exam position: Lithotomy position.      Pubic Area: No rash.       Labia:         Right: No rash, tenderness or lesion.         Left: No rash, tenderness or lesion.       Urethra: No urethral pain or urethral swelling.      Vagina: Normal. No vaginal discharge or lesions.      Cervix: No cervical motion tenderness, discharge, lesion or cervical bleeding.      Uterus: Normal. Not enlarged, not fixed and not tender.       Adnexa:         Right: No mass, " tenderness or fullness.          Left: No mass, tenderness or fullness.        Rectum: No external hemorrhoid.   Neurological:      Mental Status: She is alert and oriented to person, place, and time.   Psychiatric:         Behavior: Behavior normal.            Assessment and Plan    Problem List Items Addressed This Visit     None      Visit Diagnoses     Women's annual routine gynecological examination    -  Primary    Relevant Orders    Pap IG, Rfx HPV ASCU          1. GYN annual well woman exam.   2. Reviewed monthly self breast exams.  Instructed to call with lumps, pain, or breast discharge.    3. Reviewed exercise as a preventative health measures.   4. Recommended use of Vitamin D replacement and getting adequate calcium in her diet. (1500mg)  5. Reccommended Flu Vaccine in Fall of each year.  6. RTC in 1 year or PRN with problems      Pearl Smith MD  11/05/2020

## 2020-11-11 DIAGNOSIS — Z01.419 WOMEN'S ANNUAL ROUTINE GYNECOLOGICAL EXAMINATION: ICD-10-CM

## 2020-12-22 NOTE — PAT
RUBA [facility] MEASUREMENTS IN PAT    CALF 15 INCHES BILATERAL  LENGTH 21 INCHES   Spoke to pts mom who had questions about the birth control that daughter was taking. Per Dr. Angeles' lov note 7/202020 \"We discussed trying a different OCP first and using it continuously to see if we can suppress ovulation\" Pts mom asking for how long she should continue to take the OCP's. States that pt's symptoms have improved slightly, but still has breakthrough bleeding. Informed her that this can happen, but if it is tolerable to try and continue to take it until follow up with Dr. Angeles on 2/8/21 as BC pills can take time to regulate out hormones in body. Informed her if symptoms worsens or pt decides not to continue with OCP's, to call office to discuss further options. Pts mother voiced understanding.

## 2021-02-22 ENCOUNTER — TELEPHONE (OUTPATIENT)
Dept: INTERNAL MEDICINE | Facility: CLINIC | Age: 41
End: 2021-02-22

## 2021-02-22 DIAGNOSIS — G43.909 MIGRAINE SYNDROME: ICD-10-CM

## 2021-02-22 RX ORDER — ZOLMITRIPTAN 5 MG/1
5 TABLET, ORALLY DISINTEGRATING ORAL ONCE AS NEEDED
Qty: 9 TABLET | Refills: 11 | Status: CANCELLED | OUTPATIENT
Start: 2021-02-22

## 2021-02-22 NOTE — TELEPHONE ENCOUNTER
PATIENT IS INQUIRING ABOUT A PRIOR AUTHORIZATION FOR HER ZOLMitriptan (ZOMIG-ZMT) 5 MG disintegrating tablet PATIENT STATED THAT THE PHARMACY [id number] - [place], [place] - [address] [facility] [address] - [phone]  - [phone]      STATED THEY SENT OVER A REQUEST FOR THE PRIOR-AUTHORIZATION. PATIENT TELEPHONE VERIFIED FOR QUESTIONS [phone]

## 2021-02-23 NOTE — TELEPHONE ENCOUNTER
Prior Authorization for Zolmitriptan 5mg dispersible tablets completed and sent to plan via Covermymeds. Status pending;   KEY:[id number] ROSALES Case ID: [id number]

## 2021-02-23 NOTE — TELEPHONE ENCOUNTER
PRIOR AUTHORIZATION NEEDED FOR MEDICATION     [facility] [phone] CALLING TO REQUEST PRIOR AUTHORIZATION FOR NEW MEDICATION.     ZOLMitriptan (ZOMIG-ZMT) 5 MG disintegrating tablet    [facility] TO FAX OVER THE PRIOR AUTHORIZATION FORM THAT HAS ALL INSTRUCTIONS ON IT.     MUST HAVE PRIOR AUTHORIZATION     [facility] - 24 HRS APPROVAL    IF NON-URGENT - IT WILL TAKE 24-72 HOURS FOR APPROVAL.     [facility] HAS THE RX THAT WAS CALLED IN ON 2/21.

## 2021-02-23 NOTE — TELEPHONE ENCOUNTER
Received outcome that PA was approved by plan. Approval letter faxed to office, approved from 2/23/21-2/23/23 Called and informed pt's pharmacy of approval info. Voiced understanding. Called and spoke to pt. Voiced understanding. Had no further questions/concerns at this time.

## 2021-02-24 ENCOUNTER — PRIOR AUTHORIZATION (OUTPATIENT)
Dept: INTERNAL MEDICINE | Facility: CLINIC | Age: 41
End: 2021-02-24

## 2021-02-25 NOTE — TELEPHONE ENCOUNTER
This was resolved. See pt message from 2/22/21. Called [facility] pharmacy to double check that all was resolved. Number given in this message was the wrong number. Called number on back of card and on approval letter in media. Spoke with Gege leon. Informed that it had to have been an error because she can see in system that med has been approved and pt picked up from [facility] pharmacy. Contacted [facility] and pharm tech confirmed that approval was on file and pt picked up medication.

## 2021-05-18 RX ORDER — VITAMIN A, ASCORBIC ACID, CHOLECALCIFEROL, TOCOPHEROL, THIAMINE MONONITRATE, RIBOFLAVIN, PYRIDOXINE, FOLIC ACID, CYANOCOBALAMIN, CALCIUM CARBONATE, FERROUS FUMARATE, ZINC OXIDE, CUPRIC OXIDE, NIACINAMIDE, AND FISH OIL 27-1-250MG
KIT ORAL
Qty: 180 EACH | Refills: 2 | Status: SHIPPED | OUTPATIENT
Start: 2021-05-18 | End: 2022-03-02

## 2021-09-09 ENCOUNTER — OFFICE VISIT (OUTPATIENT)
Dept: INTERNAL MEDICINE | Facility: CLINIC | Age: 41
End: 2021-09-09

## 2021-09-09 ENCOUNTER — PRIOR AUTHORIZATION (OUTPATIENT)
Dept: INTERNAL MEDICINE | Facility: CLINIC | Age: 41
End: 2021-09-09

## 2021-09-09 VITALS
DIASTOLIC BLOOD PRESSURE: 74 MMHG | OXYGEN SATURATION: 98 % | HEIGHT: 69 IN | HEART RATE: 81 BPM | WEIGHT: 127 LBS | SYSTOLIC BLOOD PRESSURE: 126 MMHG | RESPIRATION RATE: 16 BRPM | BODY MASS INDEX: 18.81 KG/M2 | TEMPERATURE: 96.8 F

## 2021-09-09 DIAGNOSIS — G43.909 MIGRAINE SYNDROME: ICD-10-CM

## 2021-09-09 DIAGNOSIS — Z00.00 HEALTH CARE MAINTENANCE: Primary | ICD-10-CM

## 2021-09-09 PROCEDURE — 99214 OFFICE O/P EST MOD 30 MIN: CPT | Performed by: INTERNAL MEDICINE

## 2021-09-09 RX ORDER — ZOLMITRIPTAN 5 MG/1
5 TABLET, ORALLY DISINTEGRATING ORAL ONCE AS NEEDED
Qty: 9 TABLET | Refills: 11 | Status: SHIPPED | OUTPATIENT
Start: 2021-09-09 | End: 2022-09-09

## 2021-09-09 NOTE — PROGRESS NOTES
"Subjective   Jos Zuleta is a 40 y.o. female here for follow-up migraines. Pt does take zolmitriptan PRN, last filled 1-2 mos ago. No issues today. Previous rx did require PA which was approved. Pt tried maxalt in the past but it was ineffective. Zolmitriptan has worked nicely. Pt would also like to get fasting labs today. Has 9yo and 5yo.     I have reviewed the following portions of the patient's history and confirmed they are accurate: current medications, past medical history, past social history and problem list     I have personally reviewed and performed the ROS. Ghada Navarro MD     Review of Systems:  General: negative  Neuro: negative    Objective   /74   Pulse 81   Temp 96.8 °F (36 °C) (Temporal)   Resp 16   Ht 175.3 cm (69\")   Wt 57.6 kg (127 lb)   SpO2 98%   Breastfeeding No   BMI 18.75 kg/m²     Physical Exam  Vitals reviewed.   Constitutional:       Appearance: She is well-developed.   Pulmonary:      Effort: Pulmonary effort is normal.   Skin:     General: Skin is warm and dry.   Neurological:      Mental Status: She is alert and oriented to person, place, and time.   Psychiatric:         Behavior: Behavior normal.         Thought Content: Thought content normal.         Judgment: Judgment normal.         Assessment/Plan   Diagnoses and all orders for this visit:    1. Health care maintenance (Primary)  -     CBC (No Diff)  -     Comprehensive Metabolic Panel  -     Lipid Panel    2. Migraine syndrome  -     ZOLMitriptan (ZOMIG-ZMT) 5 MG disintegrating tablet; Place 1 tablet on the tongue 1 (One) Time As Needed for Migraine for up to 1 dose.  Dispense: 9 tablet; Refill: 11  -chronic stable             Ghada Navarro MD    Please note that portions of this note were completed with a voice recognition program. Efforts were made to edit the dictations, but occasionally words are mistranscribed.  Answers for HPI/ROS submitted by the patient on 9/2/2021  Please " describe your symptoms.: N/A  This visit is for my yearly migraine check.  Have you had these symptoms before?: Yes  How long have you been having these symptoms?: Greater than 2 weeks  Please list any medications you are currently taking for this condition.: Zolmitriptan  What is the primary reason for your visit?: Other

## 2021-09-09 NOTE — TELEPHONE ENCOUNTER
This is a duplicate request. See telephone encounter from  2/23/21 PA and Zolmitriptan approval letter in Media.

## 2021-09-10 LAB
ALBUMIN SERPL-MCNC: 5.2 G/DL (ref 3.5–5.2)
ALBUMIN/GLOB SERPL: 2.6 G/DL
ALP SERPL-CCNC: 40 U/L (ref 39–117)
ALT SERPL-CCNC: 11 U/L (ref 1–33)
AST SERPL-CCNC: 16 U/L (ref 1–32)
BILIRUB SERPL-MCNC: 0.3 MG/DL (ref 0–1.2)
BUN SERPL-MCNC: 14 MG/DL (ref 6–20)
BUN/CREAT SERPL: 17.1 (ref 7–25)
CALCIUM SERPL-MCNC: 9.3 MG/DL (ref 8.6–10.5)
CHLORIDE SERPL-SCNC: 105 MMOL/L (ref 98–107)
CHOLEST SERPL-MCNC: 139 MG/DL (ref 0–200)
CO2 SERPL-SCNC: 25.4 MMOL/L (ref 22–29)
CREAT SERPL-MCNC: 0.82 MG/DL (ref 0.57–1)
ERYTHROCYTE [DISTWIDTH] IN BLOOD BY AUTOMATED COUNT: 12.9 % (ref 12.3–15.4)
GLOBULIN SER CALC-MCNC: 2 GM/DL
GLUCOSE SERPL-MCNC: 95 MG/DL (ref 65–99)
HCT VFR BLD AUTO: 42 % (ref 34–46.6)
HDLC SERPL-MCNC: 61 MG/DL (ref 40–60)
HGB BLD-MCNC: 14.2 G/DL (ref 12–15.9)
LDLC SERPL CALC-MCNC: 67 MG/DL (ref 0–100)
MCH RBC QN AUTO: 30.7 PG (ref 26.6–33)
MCHC RBC AUTO-ENTMCNC: 33.8 G/DL (ref 31.5–35.7)
MCV RBC AUTO: 90.9 FL (ref 79–97)
PLATELET # BLD AUTO: 201 10*3/MM3 (ref 140–450)
POTASSIUM SERPL-SCNC: 4.7 MMOL/L (ref 3.5–5.2)
PROT SERPL-MCNC: 7.2 G/DL (ref 6–8.5)
RBC # BLD AUTO: 4.62 10*6/MM3 (ref 3.77–5.28)
SODIUM SERPL-SCNC: 140 MMOL/L (ref 136–145)
TRIGL SERPL-MCNC: 47 MG/DL (ref 0–150)
VLDLC SERPL CALC-MCNC: 11 MG/DL (ref 5–40)
WBC # BLD AUTO: 4.52 10*3/MM3 (ref 3.4–10.8)

## 2021-11-15 ENCOUNTER — OFFICE VISIT (OUTPATIENT)
Dept: OBSTETRICS AND GYNECOLOGY | Facility: CLINIC | Age: 41
End: 2021-11-15

## 2021-11-15 VITALS
DIASTOLIC BLOOD PRESSURE: 62 MMHG | SYSTOLIC BLOOD PRESSURE: 108 MMHG | BODY MASS INDEX: 18.43 KG/M2 | WEIGHT: 124.4 LBS | HEIGHT: 69 IN

## 2021-11-15 DIAGNOSIS — Z01.419 WOMEN'S ANNUAL ROUTINE GYNECOLOGICAL EXAMINATION: Primary | ICD-10-CM

## 2021-11-15 PROCEDURE — 99395 PREV VISIT EST AGE 18-39: CPT | Performed by: OBSTETRICS & GYNECOLOGY

## 2021-11-15 NOTE — PROGRESS NOTES
GYN Annual Exam     CC - Here for annual exam.        HPI  Jos Zuleta is a 40 y.o. female, , who presents for annual well woman exam. Patient's last menstrual period was 2021..  Periods are regular every 25-35 days, lasting 5 days. .  Dysmenorrhea:moderate, occurring first 1-2 days of flow.  Patient reports problems with: none.  There were no changes to her medical or surgical history since her last visit.. Partner Status: Marital Status: .  She is sexually active. She has not had new partners since her last STD testing. She does not desire STD testing. .    Additional OB/GYN History   Current contraception: contraceptive methods: None  Desires to: do not start contraception  Last Pap :   Last Completed Pap Smear          Ordered - PAP SMEAR (Every 3 Years) Ordered on 11/15/2021    2020  SCANNED - PAP SMEAR    10/10/2019  Done - negative per [place]    2019  Done - oct 10 2019              History of abnormal Pap smear: yes - ASCUS  Family history of uterine, colon, breast, or ovarian cancer: yes - Maternal Aunt-Ovarian Cancer Paternal Grandmother-Ovarian cancer  Performs monthly Self-Breast Exam: yes  Feelings of Anxiety or Depression: no  Tobacco Usage?: No   OB History        2    Para   1    Term   1            AB        Living   2       SAB        IAB        Ectopic        Molar        Multiple   0    Live Births   2                Health Maintenance   Topic Date Due   • COVID-19 Vaccine (1) Never done   • TDAP/TD VACCINES (1 - Tdap) Never done   • HEPATITIS C SCREENING  Never done   • INFLUENZA VACCINE  2021   • Annual Gynecologic Pelvic and Breast Exam  2021   • ANNUAL PHYSICAL  2022   • PAP SMEAR  2023   • Pneumococcal Vaccine 0-64  Aged Out       The additional following portions of the patient's history were reviewed and updated as appropriate: allergies, current medications, past family history, past medical history, past social  "history, past surgical history and problem list.    Review of Systems   Constitutional: Negative.    HENT: Negative.    Eyes: Negative.    Respiratory: Negative.    Cardiovascular: Negative.    Gastrointestinal: Negative.    Endocrine: Negative.    Genitourinary: Negative.    Musculoskeletal: Negative.    Skin: Negative.    Allergic/Immunologic: Negative.    Neurological: Negative.    Hematological: Negative.    Psychiatric/Behavioral: Negative.          I have reviewed and agree with the HPI, ROS, and historical information as entered above. Pearl Smith MD    Objective   /62   Ht 175.3 cm (69\")   Wt 56.4 kg (124 lb 6.4 oz)   LMP 11/11/2021   BMI 18.37 kg/m²     Physical Exam  Vitals and nursing note reviewed. Exam conducted with a chaperone present.   Constitutional:       Appearance: She is well-developed.   HENT:      Head: Normocephalic and atraumatic.   Eyes:      Pupils: Pupils are equal, round, and reactive to light.   Neck:      Thyroid: No thyroid mass or thyromegaly.   Pulmonary:      Effort: Pulmonary effort is normal. No retractions.   Chest:      Chest wall: No mass.   Breasts:      Right: Normal. No mass, nipple discharge, skin change or tenderness.      Left: Normal. No mass, nipple discharge, skin change or tenderness.       Abdominal:      General: Bowel sounds are normal.      Palpations: Abdomen is soft. Abdomen is not rigid. There is no mass.      Tenderness: There is no abdominal tenderness. There is no guarding.      Hernia: No hernia is present. There is no hernia in the left inguinal area or right inguinal area.   Genitourinary:     Exam position: Lithotomy position.      Pubic Area: No rash.       Labia:         Right: No rash, tenderness or lesion.         Left: No rash, tenderness or lesion.       Urethra: No urethral pain or urethral swelling.      Vagina: Normal. No vaginal discharge or lesions.      Cervix: No cervical motion tenderness, discharge, lesion or cervical " bleeding.      Uterus: Normal. Not enlarged, not fixed and not tender.       Adnexa:         Right: No mass, tenderness or fullness.          Left: No mass, tenderness or fullness.        Rectum: No external hemorrhoid.   Musculoskeletal:      Cervical back: Normal range of motion. No muscular tenderness.      Right lower leg: No edema.      Left lower leg: No edema.   Skin:     General: Skin is warm and dry.   Neurological:      Mental Status: She is alert and oriented to person, place, and time.      Motor: Motor function is intact.   Psychiatric:         Mood and Affect: Mood and affect normal.         Behavior: Behavior normal.            Assessment and Plan    Problem List Items Addressed This Visit     None      Visit Diagnoses     Women's annual routine gynecological examination    -  Primary    Relevant Orders    Pap IG, HPV-hr          1. GYN annual well woman exam.   2. Reviewed monthly self breast exams.  Instructed to call with lumps, pain, or breast discharge.    3. Reviewed exercise as a preventative health measures.   4. Recommended use of Vitamin D replacement and getting adequate calcium in her diet. (1500mg)  5. Reccommended Flu Vaccine in Fall of each year.  6. RTC in 1 year or PRN with problems  Return in about 1 year (around 11/15/2022) for Annual physical.      Pearl Smith MD  11/15/2021

## 2021-11-24 DIAGNOSIS — Z01.419 WOMEN'S ANNUAL ROUTINE GYNECOLOGICAL EXAMINATION: ICD-10-CM

## 2022-01-26 ENCOUNTER — HOSPITAL ENCOUNTER (OUTPATIENT)
Dept: MAMMOGRAPHY | Facility: HOSPITAL | Age: 42
Discharge: HOME | End: 2022-01-26
Admitting: OBSTETRICS & GYNECOLOGY

## 2022-01-26 DIAGNOSIS — Z01.419 WOMEN'S ANNUAL ROUTINE GYNECOLOGICAL EXAMINATION: ICD-10-CM

## 2022-01-26 PROCEDURE — 77067 SCR MAMMO BI INCL CAD: CPT | Performed by: RADIOLOGY

## 2022-01-26 PROCEDURE — 77063 BREAST TOMOSYNTHESIS BI: CPT

## 2022-01-26 PROCEDURE — 77067 SCR MAMMO BI INCL CAD: CPT

## 2022-01-26 PROCEDURE — 77063 BREAST TOMOSYNTHESIS BI: CPT | Performed by: RADIOLOGY

## 2022-02-02 ENCOUNTER — TELEPHONE (OUTPATIENT)
Dept: INTERNAL MEDICINE | Facility: CLINIC | Age: 42
End: 2022-02-02

## 2022-02-02 NOTE — TELEPHONE ENCOUNTER
She will need to be seen. Dr. Navarro is requiring all her sick patients to be seen or covid tested.

## 2022-02-02 NOTE — TELEPHONE ENCOUNTER
Caller: Irineo Zuleta    Relationship: Emergency Contact    Best call back number:[phone]    What medication are you requesting: ANTIBOTIC    What are your current symptoms: CONGESTION, BLOODY GREEN DRAINAGE,   FEVER,FATIGUE    How long have you been experiencing symptoms:  [id number]    Have you had these symptoms before:    [x] Yes  [] No    Have you been treated for these symptoms before:   [x] Yes  [] No    If a prescription is needed, what is your preferred pharmacy and phone number: [id number] - [place], [place] - [address] [facility] [address] - [phone]  - [phone]     Additional notes:  PATIENTS SPOUSE ADVISED THEY HAVE TRIED OTC, BUT NOTHING IS BREAKING THIS UP      PLEASE CALL TO ADVISE

## 2022-03-02 RX ORDER — VITAMIN A, ASCORBIC ACID, CHOLECALCIFEROL, TOCOPHEROL, THIAMINE MONONITRATE, RIBOFLAVIN, PYRIDOXINE, FOLIC ACID, CYANOCOBALAMIN, CALCIUM CARBONATE, FERROUS FUMARATE, ZINC OXIDE, CUPRIC OXIDE, NIACINAMIDE, AND FISH OIL 27-1-250MG
KIT ORAL
Qty: 180 EACH | Refills: 0 | Status: SHIPPED | OUTPATIENT
Start: 2022-03-02 | End: 2022-09-09 | Stop reason: SDUPTHER

## 2022-09-09 ENCOUNTER — LAB (OUTPATIENT)
Dept: LAB | Facility: HOSPITAL | Age: 42
End: 2022-09-09

## 2022-09-09 ENCOUNTER — OFFICE VISIT (OUTPATIENT)
Dept: INTERNAL MEDICINE | Facility: CLINIC | Age: 42
End: 2022-09-09

## 2022-09-09 VITALS
DIASTOLIC BLOOD PRESSURE: 65 MMHG | WEIGHT: 121.6 LBS | BODY MASS INDEX: 18.01 KG/M2 | SYSTOLIC BLOOD PRESSURE: 105 MMHG | HEIGHT: 69 IN | TEMPERATURE: 98.6 F

## 2022-09-09 DIAGNOSIS — Z00.00 HEALTH CARE MAINTENANCE: ICD-10-CM

## 2022-09-09 DIAGNOSIS — G43.909 MIGRAINE SYNDROME: Primary | ICD-10-CM

## 2022-09-09 LAB
ALBUMIN SERPL-MCNC: 4.6 G/DL (ref 3.5–5.2)
ALBUMIN/GLOB SERPL: 1.6 G/DL
ALP SERPL-CCNC: 57 U/L (ref 39–117)
ALT SERPL-CCNC: 11 U/L (ref 1–33)
AST SERPL-CCNC: 14 U/L (ref 1–32)
BASOPHILS # BLD AUTO: 0.06 10*3/MM3 (ref 0–0.2)
BASOPHILS NFR BLD AUTO: 1.1 % (ref 0–1.5)
BILIRUB SERPL-MCNC: 0.3 MG/DL (ref 0–1.2)
BUN SERPL-MCNC: 12 MG/DL (ref 6–20)
BUN/CREAT SERPL: 13.6 (ref 7–25)
CALCIUM SERPL-MCNC: 9.7 MG/DL (ref 8.6–10.5)
CHLORIDE SERPL-SCNC: 102 MMOL/L (ref 98–107)
CHOLEST SERPL-MCNC: 131 MG/DL (ref 0–200)
CO2 SERPL-SCNC: 25.6 MMOL/L (ref 22–29)
CREAT SERPL-MCNC: 0.88 MG/DL (ref 0.57–1)
EGFRCR-CYS SERPLBLD CKD-EPI 2021: 85.3 ML/MIN/1.73
EOSINOPHIL # BLD AUTO: 0.36 10*3/MM3 (ref 0–0.4)
EOSINOPHIL NFR BLD AUTO: 6.8 % (ref 0.3–6.2)
ERYTHROCYTE [DISTWIDTH] IN BLOOD BY AUTOMATED COUNT: 12.7 % (ref 12.3–15.4)
GLOBULIN SER CALC-MCNC: 2.9 GM/DL
GLUCOSE SERPL-MCNC: 98 MG/DL (ref 65–99)
HCT VFR BLD AUTO: 40.7 % (ref 34–46.6)
HDLC SERPL-MCNC: 68 MG/DL (ref 40–60)
HGB BLD-MCNC: 13.6 G/DL (ref 12–15.9)
IMM GRANULOCYTES # BLD AUTO: 0.01 10*3/MM3 (ref 0–0.05)
IMM GRANULOCYTES NFR BLD AUTO: 0.2 % (ref 0–0.5)
LDLC SERPL CALC-MCNC: 53 MG/DL (ref 0–100)
LYMPHOCYTES # BLD AUTO: 1.4 10*3/MM3 (ref 0.7–3.1)
LYMPHOCYTES NFR BLD AUTO: 26.6 % (ref 19.6–45.3)
MCH RBC QN AUTO: 29.6 PG (ref 26.6–33)
MCHC RBC AUTO-ENTMCNC: 33.4 G/DL (ref 31.5–35.7)
MCV RBC AUTO: 88.5 FL (ref 79–97)
MONOCYTES # BLD AUTO: 0.46 10*3/MM3 (ref 0.1–0.9)
MONOCYTES NFR BLD AUTO: 8.7 % (ref 5–12)
NEUTROPHILS # BLD AUTO: 2.97 10*3/MM3 (ref 1.7–7)
NEUTROPHILS NFR BLD AUTO: 56.6 % (ref 42.7–76)
NRBC BLD AUTO-RTO: 0 /100 WBC (ref 0–0.2)
PLATELET # BLD AUTO: 266 10*3/MM3 (ref 140–450)
POTASSIUM SERPL-SCNC: 4.5 MMOL/L (ref 3.5–5.2)
PROT SERPL-MCNC: 7.5 G/DL (ref 6–8.5)
RBC # BLD AUTO: 4.6 10*6/MM3 (ref 3.77–5.28)
SODIUM SERPL-SCNC: 136 MMOL/L (ref 136–145)
TRIGL SERPL-MCNC: 42 MG/DL (ref 0–150)
VLDLC SERPL CALC-MCNC: 10 MG/DL (ref 5–40)
WBC # BLD AUTO: 5.26 10*3/MM3 (ref 3.4–10.8)

## 2022-09-09 PROCEDURE — 99214 OFFICE O/P EST MOD 30 MIN: CPT | Performed by: INTERNAL MEDICINE

## 2022-09-09 RX ORDER — VITAMIN A, ASCORBIC ACID, CHOLECALCIFEROL, TOCOPHEROL, THIAMINE MONONITRATE, RIBOFLAVIN, PYRIDOXINE, FOLIC ACID, CYANOCOBALAMIN, CALCIUM CARBONATE, FERROUS FUMARATE, ZINC OXIDE, CUPRIC OXIDE, NIACINAMIDE, AND FISH OIL 27-1-250MG
1 KIT ORAL DAILY
Qty: 180 EACH | Refills: 3 | Status: SHIPPED | OUTPATIENT
Start: 2022-09-09

## 2022-09-09 NOTE — PROGRESS NOTES
"Subjective   Jos Zuleta is a 41 y.o. female here for follow-up migraines.  She usually uses Zomig as needed but does note the next day she has a hangover type feeling.  She has never tried any of the newer agents Ubrelvy or Zyrtec.  No change in frequency or quality of migraines.  She also wants to know does she need to continue taking a multivitamin.  She is currently taking prenatal vitamin.    I have reviewed the following portions of the patient's history and confirmed they are accurate: current medications, past medical history, past social history and problem list     I have personally reviewed and performed the ROS. Ghada Navarro MD     Review of Systems:  General: negative  Neuro: Migraines    Objective   /65   Temp 98.6 °F (37 °C) (Temporal)   Ht 175.3 cm (69\")   Wt 55.2 kg (121 lb 9.6 oz)   BMI 17.96 kg/m²     Physical Exam  Vitals reviewed.   Constitutional:       Appearance: She is well-developed.   Pulmonary:      Effort: Pulmonary effort is normal.   Skin:     General: Skin is warm and dry.   Neurological:      Mental Status: She is alert and oriented to person, place, and time.   Psychiatric:         Behavior: Behavior normal.         Thought Content: Thought content normal.         Judgment: Judgment normal.         Assessment & Plan   Diagnoses and all orders for this visit:    1. Migraine syndrome  -     Rimegepant Sulfate (NURTEC) 75 MG tablet dispersible tablet; Take 1 tablet by mouth Daily As Needed (migraine).  Dispense: 10 tablet; Refill: 11  -Stop triptan, start Nurtec.  Gave samples of Ubrelvy as I did not have Nurtec though they work very similarly    2. Health care maintenance (Primary)  -     CBC & Differential  -     Comprehensive Metabolic Panel  -     Lipid Panel                 Ghada Navarro MD  Answers for HPI/ROS submitted by the patient on 9/2/2022  Please describe your symptoms.: Routine check for migraines  Have you had these symptoms " before?: Yes  How long have you been having these symptoms?: Greater than 2 weeks  Please list any medications you are currently taking for this condition.: Zolmitriptan  What is the primary reason for your visit?: Other

## 2022-11-17 ENCOUNTER — OFFICE VISIT (OUTPATIENT)
Dept: OBSTETRICS AND GYNECOLOGY | Facility: CLINIC | Age: 42
End: 2022-11-17

## 2022-11-17 VITALS
BODY MASS INDEX: 18.93 KG/M2 | WEIGHT: 127.8 LBS | SYSTOLIC BLOOD PRESSURE: 106 MMHG | DIASTOLIC BLOOD PRESSURE: 68 MMHG | HEIGHT: 69 IN

## 2022-11-17 DIAGNOSIS — Z01.419 WOMEN'S ANNUAL ROUTINE GYNECOLOGICAL EXAMINATION: Primary | ICD-10-CM

## 2022-11-17 PROCEDURE — 99396 PREV VISIT EST AGE 40-64: CPT | Performed by: OBSTETRICS & GYNECOLOGY

## 2022-11-17 NOTE — PROGRESS NOTES
Gynecologic Annual Exam Note          GYN Annual Exam     Gynecologic Exam (Annual )        Subjective     HPI  Jos Zuleta is a 41 y.o. female, , who presents for annual well woman exam as a established patient . Patient's last menstrual period was 2022..  Periods are regular every 25-35 days, lasting 6-7 days. The flow is moderate. Dysmenorrhea:mild, occurring first 1-2 days of flow. . Patient reports problems with: none.  Partner Status: Marital Status: . She is is sexually active. She has not had new partners.. STD testing recommendations have been explained to the patient and she does not desire STD testing. There were no changes to her medical or surgical history since her last visit..     Desires annual paps      Additional OB/GYN History   Current contraception: contraceptive methods: Condoms  Desires to: do not start contraception    Last Pap : 11/15/2021. Result: negative. HPV: negative  Last Completed Pap Smear          Ordered - PAP SMEAR (Every 3 Years) Ordered on 2022    11/15/2021  SCANNED - PAP SMEAR    2020  SCANNED - PAP SMEAR    10/10/2019  Done - negative per [place]    2019  Done - oct 10 2019              History of abnormal Pap smear: yes - Ascus  Family history of uterine, colon, breast, or ovarian cancer: yes - Maternal Aunt-Ovarian Cancer Paternal Grandmother-Ovarian cancer  Performs monthly Self-Breast Exam: yes  Last mammogram: 2022. Done at .    Last Completed Mammogram          Ordered - MAMMOGRAM (Yearly) Ordered on 2022  Mammo Screening Digital Tomosynthesis Bilateral With CAD                History of abnormal mammogram: no    Colonoscopy: has never had a colonoscopy.  Exercises Regularly: yes  Feelings of Anxiety or Depression: no  Tobacco Usage?: No       Current Outpatient Medications:   •  Prenatal Vit-Fe Fum-FA-Omega (PNV Prenatal Plus Multivit+DHA) 27-1 & 312 MG misc, Take 1 tablet by mouth  "Daily., Disp: 180 each, Rfl: 3  •  Rimegepant Sulfate (NURTEC) 75 MG tablet dispersible tablet, Take 1 tablet by mouth Daily As Needed (migraine)., Disp: 10 tablet, Rfl: 11     Patient denies the need for medication refills today.    OB History        3    Para   2    Term   2            AB        Living   2       SAB        IAB        Ectopic        Molar        Multiple   0    Live Births   2                Past Medical History:   Diagnosis Date   • Migraine    • Pregnancy, incidental     Dr Maryellen Rodriguez        Past Surgical History:   Procedure Laterality Date   •  SECTION     •  SECTION N/A 08/15/2017    Procedure:  SECTION REPEAT;  Surgeon: Pearl Smith MD;  Location: [facility] LABOR DELIVERY;  Service:    • MOUTH SURGERY     • WISDOM TOOTH EXTRACTION         Health Maintenance   Topic Date Due   • COVID-19 Vaccine (1) Never done   • TDAP/TD VACCINES (1 - Tdap) Never done   • HEPATITIS C SCREENING  Never done   • INFLUENZA VACCINE  2022   • ANNUAL PHYSICAL  2022   • MAMMOGRAM  2023   • PAP SMEAR  11/15/2024   • Pneumococcal Vaccine 0-64  Aged Out       The additional following portions of the patient's history were reviewed and updated as appropriate: allergies, current medications, past family history, past medical history, past social history, past surgical history and problem list.    Review of Systems   Constitutional: Negative.    HENT: Negative.    Eyes: Negative.    Respiratory: Negative.    Cardiovascular: Negative.    Gastrointestinal: Negative.    Endocrine: Negative.    Genitourinary: Negative.    Musculoskeletal: Negative.    Skin: Negative.    Allergic/Immunologic: Negative.    Neurological: Negative.    Hematological: Negative.    Psychiatric/Behavioral: Negative.          I have reviewed and agree with the HPI, ROS, and historical information as entered above. Pearl Smith MD      Objective   /68   Ht 175.3 cm (69\")   Wt 58 kg (127 lb " 12.8 oz)   LMP 11/12/2022   BMI 18.87 kg/m²     Physical Exam  Vitals and nursing note reviewed. Exam conducted with a chaperone present.   Constitutional:       Appearance: She is well-developed.   HENT:      Head: Normocephalic and atraumatic.   Eyes:      Pupils: Pupils are equal, round, and reactive to light.   Neck:      Thyroid: No thyroid mass or thyromegaly.   Pulmonary:      Effort: Pulmonary effort is normal. No retractions.   Chest:      Chest wall: No mass.   Breasts:     Right: Normal. No mass, nipple discharge, skin change or tenderness.      Left: Normal. No mass, nipple discharge, skin change or tenderness.   Abdominal:      General: Bowel sounds are normal.      Palpations: Abdomen is soft. Abdomen is not rigid. There is no mass.      Tenderness: There is no abdominal tenderness. There is no guarding.      Hernia: No hernia is present. There is no hernia in the left inguinal area or right inguinal area.   Genitourinary:     Exam position: Lithotomy position.      Pubic Area: No rash.       Labia:         Right: No rash, tenderness or lesion.         Left: No rash, tenderness or lesion.       Urethra: No urethral pain or urethral swelling.      Vagina: Normal. No vaginal discharge or lesions.      Cervix: No cervical motion tenderness, discharge, lesion or cervical bleeding.      Uterus: Normal. Not enlarged, not fixed and not tender.       Adnexa:         Right: No mass, tenderness or fullness.          Left: No mass, tenderness or fullness.        Rectum: No external hemorrhoid.   Musculoskeletal:      Cervical back: Normal range of motion. No muscular tenderness.      Right lower leg: No edema.      Left lower leg: No edema.   Skin:     General: Skin is warm and dry.   Neurological:      Mental Status: She is alert and oriented to person, place, and time.      Motor: Motor function is intact.   Psychiatric:         Mood and Affect: Mood and affect normal.         Behavior: Behavior normal.             Assessment and Plan    Problem List Items Addressed This Visit    None  Visit Diagnoses     Women's annual routine gynecological examination    -  Primary    Relevant Orders    LIQUID-BASED PAP SMEAR, [facility] ([facility])    Mammo Screening Digital Tomosynthesis Bilateral With CAD          GYN annual well woman exam.   Pap guidelines reviewed. Desires annual  Reviewed monthly self breast exams.  Instructed to call with lumps, pain, or breast discharge.    Ordered Mammogram today  Reviewed exercise as a preventative health measures.   Reccommended Flu Vaccine in Fall of each year.  RTC in 1 year or PRN with problems.  Return in about 1 year (around 11/17/2023) for Annual physical, Schedule [facility].     Pearl Smith MD  11/17/2022

## 2022-11-22 LAB — REF LAB TEST METHOD: NORMAL

## 2023-01-03 ENCOUNTER — TELEPHONE (OUTPATIENT)
Dept: OBSTETRICS AND GYNECOLOGY | Facility: CLINIC | Age: 43
End: 2023-01-03
Payer: COMMERCIAL

## 2023-01-03 DIAGNOSIS — R92.8 ABNORMAL MAMMOGRAM: Primary | ICD-10-CM

## 2023-01-03 NOTE — TELEPHONE ENCOUNTER
The order that Dr. Smith placed was for routine. The order from Dr. Baker will  before she can get scheduled. New order placed. She does not want to pay the 1000 out of pocket and just wants to have her screening mammogram. I will have to talk to Dr. Smith and call her back

## 2023-01-03 NOTE — TELEPHONE ENCOUNTER
Need order for diagnostic mammogram, pt had abnormal pervious mammo and was supposed to come back for more views but did not and will not schedule unless its diagnostic order, thank you :)

## 2023-01-04 NOTE — TELEPHONE ENCOUNTER
I spoke with Dr. Smith and she said that if the if the pt wants to do a screening this year and go to another facility she will put in the order. She will have to call [phone] [facility] to get her films and she wants the order to go to [facility].

## 2023-09-12 ENCOUNTER — OFFICE VISIT (OUTPATIENT)
Dept: INTERNAL MEDICINE | Facility: CLINIC | Age: 43
End: 2023-09-12
Payer: COMMERCIAL

## 2023-09-12 ENCOUNTER — LAB (OUTPATIENT)
Dept: INTERNAL MEDICINE | Facility: CLINIC | Age: 43
End: 2023-09-12
Payer: COMMERCIAL

## 2023-09-12 VITALS
HEART RATE: 83 BPM | SYSTOLIC BLOOD PRESSURE: 118 MMHG | HEIGHT: 69 IN | DIASTOLIC BLOOD PRESSURE: 80 MMHG | OXYGEN SATURATION: 99 % | WEIGHT: 125 LBS | BODY MASS INDEX: 18.51 KG/M2

## 2023-09-12 DIAGNOSIS — Z00.00 HEALTH CARE MAINTENANCE: Primary | ICD-10-CM

## 2023-09-12 DIAGNOSIS — Z00.00 HEALTHCARE MAINTENANCE: Primary | ICD-10-CM

## 2023-09-12 DIAGNOSIS — G43.909 MIGRAINE SYNDROME: ICD-10-CM

## 2023-09-12 DIAGNOSIS — F41.8 SITUATIONAL ANXIETY: ICD-10-CM

## 2023-09-12 LAB
DEPRECATED RDW RBC AUTO: 40.5 FL (ref 37–54)
ERYTHROCYTE [DISTWIDTH] IN BLOOD BY AUTOMATED COUNT: 12.3 % (ref 12.3–15.4)
HCT VFR BLD AUTO: 37.7 % (ref 34–46.6)
HGB BLD-MCNC: 12.8 G/DL (ref 12–15.9)
MCH RBC QN AUTO: 30.8 PG (ref 26.6–33)
MCHC RBC AUTO-ENTMCNC: 34 G/DL (ref 31.5–35.7)
MCV RBC AUTO: 90.8 FL (ref 79–97)
PLATELET # BLD AUTO: 208 10*3/MM3 (ref 140–450)
PMV BLD AUTO: 11.6 FL (ref 6–12)
RBC # BLD AUTO: 4.15 10*6/MM3 (ref 3.77–5.28)
WBC NRBC COR # BLD: 4.55 10*3/MM3 (ref 3.4–10.8)

## 2023-09-12 PROCEDURE — 80061 LIPID PANEL: CPT | Performed by: INTERNAL MEDICINE

## 2023-09-12 PROCEDURE — 36415 COLL VENOUS BLD VENIPUNCTURE: CPT | Performed by: INTERNAL MEDICINE

## 2023-09-12 PROCEDURE — 80053 COMPREHEN METABOLIC PANEL: CPT | Performed by: INTERNAL MEDICINE

## 2023-09-12 PROCEDURE — 99396 PREV VISIT EST AGE 40-64: CPT | Performed by: INTERNAL MEDICINE

## 2023-09-12 PROCEDURE — 85027 COMPLETE CBC AUTOMATED: CPT | Performed by: INTERNAL MEDICINE

## 2023-09-12 RX ORDER — PROPRANOLOL HYDROCHLORIDE 10 MG/1
10 TABLET ORAL DAILY PRN
Qty: 30 TABLET | Refills: 2 | Status: SHIPPED | OUTPATIENT
Start: 2023-09-12

## 2023-09-12 RX ORDER — ZOLMITRIPTAN 5 MG/1
5 TABLET, ORALLY DISINTEGRATING ORAL ONCE AS NEEDED
Qty: 10 TABLET | Refills: 5 | Status: SHIPPED | OUTPATIENT
Start: 2023-09-12

## 2023-09-12 RX ORDER — VITAMIN A, ASCORBIC ACID, CHOLECALCIFEROL, TOCOPHEROL, THIAMINE MONONITRATE, RIBOFLAVIN, PYRIDOXINE, FOLIC ACID, CYANOCOBALAMIN, CALCIUM CARBONATE, FERROUS FUMARATE, ZINC OXIDE, CUPRIC OXIDE, NIACINAMIDE, AND FISH OIL 27-1-250MG
1 KIT ORAL DAILY
Qty: 180 EACH | Refills: 3 | Status: SHIPPED | OUTPATIENT
Start: 2023-09-12 | End: 2023-09-13

## 2023-09-12 NOTE — PROGRESS NOTES
Subjective   Jos Zuleta is a 42 y.o. female here for yearly physical. Still having migraines. Would like to switch back to zolmitriptan as the Nurtec made her extremely nauseous and did not work for migraine.  She is also having periods of increasing anxiety.  She has noticed it when she is a passenger in a car or just sometimes the anxiety hits her.  She has a stressful job and young kids.    Past Medical History:   Diagnosis Date    Migraine     Pregnancy, incidental     Dr Maryellen Rodriguez     Family History   Problem Relation Age of Onset    Stroke Mother     Migraines Mother     Heart disease Father     Cancer Maternal Aunt     Stroke Maternal Grandmother     Migraines Maternal Grandmother     Heart attack Maternal Grandmother     Heart disease Maternal Grandmother     Hyperlipidemia Maternal Grandmother     Hyperlipidemia Maternal Grandfather     Stroke Maternal Grandfather     Cancer Paternal Grandmother     Stroke Paternal Grandmother     Migraines Paternal Grandmother     Heart attack Paternal Grandmother     Ovarian cancer Paternal Grandmother     Stroke Other     Breast cancer Neg Hx      Past Surgical History:   Procedure Laterality Date     SECTION       SECTION N/A 08/15/2017    Procedure:  SECTION REPEAT;  Surgeon: Pearl Smith MD;  [facility];  Service:     MOUTH SURGERY      WISDOM TOOTH EXTRACTION       Social History     Socioeconomic History    Marital status:    Tobacco Use    Smoking status: Never     Passive exposure: Never    Smokeless tobacco: Never   Vaping Use    Vaping Use: Never used   Substance and Sexual Activity    Alcohol use: No    Drug use: No    Sexual activity: Yes     Partners: Male     Birth control/protection: None         Current Outpatient Medications:     Prenatal Vit-Fe Fum-FA-Omega (PNV Prenatal Plus Multivit+DHA) 27-1 & 312 MG misc, Take 1 tablet by mouth Daily., Disp: 180 each, Rfl: 3    Objective   /80 (BP  "Location: Left arm)   Pulse 83   Ht 175.3 cm (69\")   Wt 56.7 kg (125 lb)   SpO2 99%   BMI 18.46 kg/m²   Physical Exam  Vitals reviewed.   Constitutional:       Appearance: She is well-developed.   Pulmonary:      Effort: Pulmonary effort is normal.   Neurological:      General: No focal deficit present.      Mental Status: She is alert.   Psychiatric:         Behavior: Behavior normal.         Thought Content: Thought content normal.         Judgment: Judgment normal.       Assessment & Plan   Diagnoses and all orders for this visit:    1. Health care maintenance (Primary)  -     CBC (No Diff)  -     Comprehensive Metabolic Panel  -     Lipid Panel    2. Migraine syndrome  -     ZOLMitriptan (ZOMIG-ZMT) 5 MG disintegrating tablet; Place 1 tablet on the tongue 1 (One) Time As Needed for Migraine for up to 60 doses.  Dispense: 10 tablet; Refill: 5  -SE to nurtec. Failed rizatriptan    3. Situational anxiety  -     propranolol (INDERAL) 10 MG tablet; Take 1 tablet by mouth Daily As Needed (anxiety).  Dispense: 30 tablet; Refill: 2  -new med discussed SE, uses    Other orders  -     Prenatal Vit-Fe Fum-FA-Omega (PNV Prenatal Plus Multivit+DHA) 27-1 & 312 MG misc; Take 1 tablet by mouth Daily.  Dispense: 180 each; Refill: 3        1. Health Care Maintenance  -pap UTD  -mamm reviewed, was rec to get dx but the cost was exorbitant following some dental work so she deferred that.  She is going to try to get a screening mammogram in November at [facility].  Discussed with GYN  -cscope at 46  -fasting labs  -BMI is below normal parameters (malnutrition). Recommendations: her BMI has been steady     Reviewed the following with the patient: advised patient of need for:  pap smear, mammogram, and immunizations discussed.  Counseled regarding: age-appropriate screening labs and tests, wearing seatbelt and sunscreen regularly  Discussed: regular exercise and diet changes to promote healthy weight, checking skin regularly for " abnormal moles and lesions

## 2023-09-13 LAB
ALBUMIN SERPL-MCNC: 4.8 G/DL (ref 3.5–5.2)
ALBUMIN/GLOB SERPL: 2.2 G/DL
ALP SERPL-CCNC: 47 U/L (ref 39–117)
ALT SERPL W P-5'-P-CCNC: 28 U/L (ref 1–33)
ANION GAP SERPL CALCULATED.3IONS-SCNC: 12.4 MMOL/L (ref 5–15)
AST SERPL-CCNC: 26 U/L (ref 1–32)
BILIRUB SERPL-MCNC: 0.3 MG/DL (ref 0–1.2)
BUN SERPL-MCNC: 11 MG/DL (ref 6–20)
BUN/CREAT SERPL: 13.9 (ref 7–25)
CALCIUM SPEC-SCNC: 9.7 MG/DL (ref 8.6–10.5)
CHLORIDE SERPL-SCNC: 104 MMOL/L (ref 98–107)
CHOLEST SERPL-MCNC: 115 MG/DL (ref 0–200)
CO2 SERPL-SCNC: 23.6 MMOL/L (ref 22–29)
CREAT SERPL-MCNC: 0.79 MG/DL (ref 0.57–1)
EGFRCR SERPLBLD CKD-EPI 2021: 96.5 ML/MIN/1.73
GLOBULIN UR ELPH-MCNC: 2.2 GM/DL
GLUCOSE SERPL-MCNC: 91 MG/DL (ref 65–99)
HDLC SERPL-MCNC: 61 MG/DL (ref 40–60)
LDLC SERPL CALC-MCNC: 45 MG/DL (ref 0–100)
LDLC/HDLC SERPL: 0.79 {RATIO}
POTASSIUM SERPL-SCNC: 4.2 MMOL/L (ref 3.5–5.2)
PROT SERPL-MCNC: 7 G/DL (ref 6–8.5)
SODIUM SERPL-SCNC: 140 MMOL/L (ref 136–145)
TRIGL SERPL-MCNC: 29 MG/DL (ref 0–150)
VLDLC SERPL-MCNC: 9 MG/DL (ref 5–40)

## 2023-09-13 RX ORDER — VITAMIN A, ASCORBIC ACID, CHOLECALCIFEROL, TOCOPHEROL, THIAMINE MONONITRATE, RIBOFLAVIN, PYRIDOXINE, FOLIC ACID, CYANOCOBALAMIN, CALCIUM CARBONATE, FERROUS FUMARATE, ZINC OXIDE, CUPRIC OXIDE, NIACINAMIDE, AND FISH OIL 27-1-250MG
KIT ORAL
Qty: 180 EACH | Refills: 0 | Status: SHIPPED | OUTPATIENT
Start: 2023-09-13

## 2023-09-14 RX ORDER — VITAMIN A, ASCORBIC ACID, CHOLECALCIFEROL, TOCOPHEROL, THIAMINE MONONITRATE, RIBOFLAVIN, PYRIDOXINE, FOLIC ACID, CYANOCOBALAMIN, CALCIUM CARBONATE, FERROUS FUMARATE, ZINC OXIDE, CUPRIC OXIDE, NIACINAMIDE, AND FISH OIL 27-1-250MG
KIT ORAL
Qty: 180 EACH | Refills: 0 | OUTPATIENT
Start: 2023-09-14

## 2023-11-27 ENCOUNTER — OFFICE VISIT (OUTPATIENT)
Dept: OBSTETRICS AND GYNECOLOGY | Facility: CLINIC | Age: 43
End: 2023-11-27
Payer: COMMERCIAL

## 2023-11-27 VITALS
HEIGHT: 69 IN | SYSTOLIC BLOOD PRESSURE: 118 MMHG | WEIGHT: 133.4 LBS | BODY MASS INDEX: 19.76 KG/M2 | DIASTOLIC BLOOD PRESSURE: 72 MMHG

## 2023-11-27 DIAGNOSIS — Z01.419 WOMEN'S ANNUAL ROUTINE GYNECOLOGICAL EXAMINATION: Primary | ICD-10-CM

## 2023-11-27 PROCEDURE — 99396 PREV VISIT EST AGE 40-64: CPT | Performed by: OBSTETRICS & GYNECOLOGY

## 2023-11-27 NOTE — PROGRESS NOTES
Gynecologic Annual Exam Note          GYN Annual Exam     No chief complaint on file.        Subjective     HPI  Jos Zuleta is a 42 y.o. female, , who presents for annual well woman exam as a established patient . Patient's last menstrual period was 11/15/2023..  Patient reports problems with: none.  Her periods occur every 25-35 days , lasting 6 days. The flow is moderate.. She reports dysmenorrhea is mild, occurring premenstrually and first 1-2 days of flow. Partner Status: Marital Status: . She is is sexually active. She has not had new partners.. STD testing recommendations have been explained to the patient and she does not desire STD testing. Since her last visit the patient underwent surgery for gum grafting 2023 .       Additional OB/GYN History   Current contraception: contraceptive methods: None  Desires to: do not start contraception    Last Pap : 2022. Result: ASCUS. HPV: negative.   Last Completed Pap Smear            Ordered - PAP SMEAR (Every 3 Years) Ordered on 2023  LIQUID-BASED PAP SMEAR, [facility] ([facility])    11/15/2021  SCANNED - PAP SMEAR    2020  SCANNED - PAP SMEAR    10/10/2019  Done - negative per [place]    2019  Done - oct 10 2019    Only the first 5 history entries have been loaded, but more history exists.                  History of abnormal Pap smear: no  Family history of uterine, colon, breast, or ovarian cancer: yes - PGM- ovarian cancer  Performs monthly Self-Breast Exam: yes  Last mammogram: 2022. Done at .    Last Completed Mammogram       This patient has no relevant Health Maintenance data.            History of abnormal mammogram: no    Colonoscopy: has never had a colonoscopy.  Exercises Regularly: yes  Feelings of Anxiety or Depression: no  Tobacco Usage?: No       Current Outpatient Medications:     Prenatal Vit-Fe Fum-FA-Omega (PNV Prenatal Plus Multivit+DHA) 27-1 & 312 MG misc, TAKE 1 PNV  "PLUS TABLET AND 1 DHA CAPSULE ONCE DAILY, Disp: 180 each, Rfl: 0    propranolol (INDERAL) 10 MG tablet, Take 1 tablet by mouth Daily As Needed (anxiety)., Disp: 30 tablet, Rfl: 2    ZOLMitriptan (ZOMIG-ZMT) 5 MG disintegrating tablet, Place 1 tablet on the tongue 1 (One) Time As Needed for Migraine for up to 60 doses., Disp: 10 tablet, Rfl: 5     Patient denies the need for medication refills today.    OB History          3    Para   2    Term   2            AB        Living   2         SAB        IAB        Ectopic        Molar        Multiple   0    Live Births   2                Past Medical History:   Diagnosis Date    Migraine     Pregnancy, incidental     Dr Maryellen Rodriguez        Past Surgical History:   Procedure Laterality Date     SECTION       SECTION N/A 08/15/2017    Procedure:  SECTION REPEAT;  Surgeon: Pearl Smith MD;  Location: [facility] LABOR DELIVERY;  Service:     MOUTH SURGERY      MOUTH SURGERY  2023    GUM GRAFTING    WISDOM TOOTH EXTRACTION         Health Maintenance   Topic Date Due    COVID-19 Vaccine (1) Never done    TDAP/TD VACCINES (1 - Tdap) Never done    HEPATITIS C SCREENING  Never done    MAMMOGRAM  2023    Annual Gynecologic Pelvic and Breast Exam  2023    ANNUAL PHYSICAL  2024    PAP SMEAR  2025    INFLUENZA VACCINE  Completed    Pneumococcal Vaccine 0-64  Aged Out       The additional following portions of the patient's history were reviewed and updated as appropriate: allergies, current medications, past family history, past medical history, past social history, and past surgical history.    Review of Systems      I have reviewed and agree with the HPI, ROS, and historical information as entered above. Pearl Smith MD          Objective   /72   Ht 175.3 cm (69\")   Wt 60.5 kg (133 lb 6.4 oz)   LMP 11/15/2023   BMI 19.70 kg/m²     Physical Exam  Vitals and nursing note reviewed. Exam conducted with a " chaperone present.   Constitutional:       Appearance: She is well-developed.   HENT:      Head: Normocephalic and atraumatic.   Eyes:      Pupils: Pupils are equal, round, and reactive to light.   Neck:      Thyroid: No thyroid mass or thyromegaly.   Pulmonary:      Effort: Pulmonary effort is normal. No retractions.   Chest:      Chest wall: No mass.   Breasts:     Right: Normal. No mass, nipple discharge, skin change or tenderness.      Left: Normal. No mass, nipple discharge, skin change or tenderness.   Abdominal:      General: Bowel sounds are normal.      Palpations: Abdomen is soft. Abdomen is not rigid. There is no mass.      Tenderness: There is no abdominal tenderness. There is no guarding.      Hernia: No hernia is present. There is no hernia in the left inguinal area or right inguinal area.   Genitourinary:     Exam position: Lithotomy position.      Pubic Area: No rash.       Labia:         Right: No rash, tenderness or lesion.         Left: No rash, tenderness or lesion.       Urethra: No urethral pain or urethral swelling.      Vagina: Normal. No vaginal discharge or lesions.      Cervix: No cervical motion tenderness, discharge, lesion or cervical bleeding.      Uterus: Normal. Not enlarged, not fixed and not tender.       Adnexa:         Right: No mass, tenderness or fullness.          Left: No mass, tenderness or fullness.        Rectum: No external hemorrhoid.   Musculoskeletal:      Cervical back: Normal range of motion. No muscular tenderness.      Right lower leg: No edema.      Left lower leg: No edema.   Skin:     General: Skin is warm and dry.   Neurological:      Mental Status: She is alert and oriented to person, place, and time.      Motor: Motor function is intact.   Psychiatric:         Mood and Affect: Mood and affect normal.         Behavior: Behavior normal.          Assessment and Plan    Problem List Items Addressed This Visit    None  Visit Diagnoses       Women's annual routine  gynecological examination    -  Primary    Relevant Orders    LIQUID-BASED PAP SMEAR WITH HPV GENOTYPING IF ASCUS ([facility])    Mammo Screening Digital Tomosynthesis Bilateral With CAD            GYN annual well woman exam.   Pap guidelines reviewed.  Reviewed monthly self breast exams.  Instructed to call with lumps, pain, or breast discharge.    Ordered Mammogram today  Reviewed exercise as a preventative health measures.   RTC in 1 year or PRN with problems.  Return in about 1 year (around 11/27/2024) for Annual physical.     Pearl Smith MD  11/27/2023

## 2023-12-01 ENCOUNTER — TELEPHONE (OUTPATIENT)
Dept: OBSTETRICS AND GYNECOLOGY | Facility: CLINIC | Age: 43
End: 2023-12-01
Payer: COMMERCIAL

## 2023-12-01 LAB — REF LAB TEST METHOD: NORMAL

## 2023-12-05 ENCOUNTER — TELEPHONE (OUTPATIENT)
Dept: OBSTETRICS AND GYNECOLOGY | Facility: CLINIC | Age: 43
End: 2023-12-05
Payer: COMMERCIAL

## 2023-12-05 DIAGNOSIS — R92.8 ABNORMAL MAMMOGRAM: Primary | ICD-10-CM

## 2023-12-05 NOTE — TELEPHONE ENCOUNTER
"Screening mammogram order sent back, needs new order placed per scheduling \"PATIENT HAD A BIRAD 0 ARTURO LAST ON 01/26/2022, NEEDS A DIAG ARTURO ORDER. \"  "

## 2024-01-26 ENCOUNTER — HOSPITAL ENCOUNTER (OUTPATIENT)
Dept: MAMMOGRAPHY | Facility: HOSPITAL | Age: 44
Discharge: HOME | End: 2024-01-26
Admitting: OBSTETRICS & GYNECOLOGY
Payer: COMMERCIAL

## 2024-01-26 DIAGNOSIS — R92.8 ABNORMAL MAMMOGRAM: ICD-10-CM

## 2024-01-26 PROCEDURE — G0279 TOMOSYNTHESIS, MAMMO: HCPCS

## 2024-01-26 PROCEDURE — 77066 DX MAMMO INCL CAD BI: CPT

## 2024-09-30 RX ORDER — VITAMIN A, ASCORBIC ACID, CHOLECALCIFEROL, TOCOPHEROL, THIAMINE MONONITRATE, RIBOFLAVIN, PYRIDOXINE, FOLIC ACID, CYANOCOBALAMIN, CALCIUM CARBONATE, FERROUS FUMARATE, ZINC OXIDE, CUPRIC OXIDE, NIACINAMIDE, AND FISH OIL 27-1-250MG
1 KIT ORAL DAILY
Qty: 30 EACH | Refills: 0 | Status: SHIPPED | OUTPATIENT
Start: 2024-09-30

## 2024-10-21 ENCOUNTER — OFFICE VISIT (OUTPATIENT)
Dept: INTERNAL MEDICINE | Facility: CLINIC | Age: 44
End: 2024-10-21
Payer: COMMERCIAL

## 2024-10-21 ENCOUNTER — LAB (OUTPATIENT)
Dept: INTERNAL MEDICINE | Facility: CLINIC | Age: 44
End: 2024-10-21
Payer: COMMERCIAL

## 2024-10-21 VITALS
BODY MASS INDEX: 18.37 KG/M2 | SYSTOLIC BLOOD PRESSURE: 120 MMHG | HEART RATE: 90 BPM | DIASTOLIC BLOOD PRESSURE: 70 MMHG | WEIGHT: 124 LBS | OXYGEN SATURATION: 97 % | HEIGHT: 69 IN

## 2024-10-21 DIAGNOSIS — F41.8 SITUATIONAL ANXIETY: ICD-10-CM

## 2024-10-21 DIAGNOSIS — G43.909 MIGRAINE SYNDROME: ICD-10-CM

## 2024-10-21 DIAGNOSIS — Z00.00 HEALTHCARE MAINTENANCE: Primary | ICD-10-CM

## 2024-10-21 LAB
DEPRECATED RDW RBC AUTO: 39.1 FL (ref 37–54)
ERYTHROCYTE [DISTWIDTH] IN BLOOD BY AUTOMATED COUNT: 11.9 % (ref 12.3–15.4)
HCT VFR BLD AUTO: 39.9 % (ref 34–46.6)
HGB BLD-MCNC: 13.4 G/DL (ref 12–15.9)
MCH RBC QN AUTO: 30.5 PG (ref 26.6–33)
MCHC RBC AUTO-ENTMCNC: 33.6 G/DL (ref 31.5–35.7)
MCV RBC AUTO: 90.7 FL (ref 79–97)
PLATELET # BLD AUTO: 216 10*3/MM3 (ref 140–450)
PMV BLD AUTO: 11.1 FL (ref 6–12)
RBC # BLD AUTO: 4.4 10*6/MM3 (ref 3.77–5.28)
WBC NRBC COR # BLD AUTO: 4.71 10*3/MM3 (ref 3.4–10.8)

## 2024-10-21 PROCEDURE — 85027 COMPLETE CBC AUTOMATED: CPT | Performed by: INTERNAL MEDICINE

## 2024-10-21 PROCEDURE — 80061 LIPID PANEL: CPT | Performed by: INTERNAL MEDICINE

## 2024-10-21 PROCEDURE — 80053 COMPREHEN METABOLIC PANEL: CPT | Performed by: INTERNAL MEDICINE

## 2024-10-21 PROCEDURE — 99396 PREV VISIT EST AGE 40-64: CPT | Performed by: INTERNAL MEDICINE

## 2024-10-21 PROCEDURE — 36415 COLL VENOUS BLD VENIPUNCTURE: CPT | Performed by: INTERNAL MEDICINE

## 2024-10-21 RX ORDER — ZOLMITRIPTAN 5 MG/1
5 TABLET, ORALLY DISINTEGRATING ORAL ONCE AS NEEDED
Qty: 10 TABLET | Refills: 11 | Status: SHIPPED | OUTPATIENT
Start: 2024-10-21

## 2024-10-21 RX ORDER — PROPRANOLOL HCL 10 MG
10 TABLET ORAL DAILY PRN
Qty: 30 TABLET | Refills: 2 | Status: SHIPPED | OUTPATIENT
Start: 2024-10-21

## 2024-10-21 NOTE — PROGRESS NOTES
Subjective   Jso Zuleta is a 43 y.o. female here for yearly physical.  She is wearing invisalign and has lost weight due to having to wear the retainers 22 hours a day.  Recently saw GYN and had her mammogram done.  No acute issues today.    Past Medical History:   Diagnosis Date    Migraine     Pregnancy, incidental     Dr Maryellen Rodriguez     Family History   Problem Relation Age of Onset    Stroke Mother     Migraines Mother     Heart disease Father     Cancer Maternal Aunt     Stroke Maternal Grandmother     Migraines Maternal Grandmother     Heart attack Maternal Grandmother     Heart disease Maternal Grandmother     Hyperlipidemia Maternal Grandmother     Hypertension Maternal Grandmother     Hyperlipidemia Maternal Grandfather     Stroke Maternal Grandfather     Hypertension Maternal Grandfather     Cancer Paternal Grandmother     Stroke Paternal Grandmother     Migraines Paternal Grandmother     Heart attack Paternal Grandmother     Ovarian cancer Paternal Grandmother     Stroke Other     Breast cancer Neg Hx      Past Surgical History:   Procedure Laterality Date     SECTION       SECTION N/A 08/15/2017    Procedure:  SECTION REPEAT;  Surgeon: Pearl Smith MD;  Location: [facility] LABOR DELIVERY;  Service:     MOUTH SURGERY      MOUTH SURGERY  2023    GUM GRAFTING    WISDOM TOOTH EXTRACTION       Social History     Socioeconomic History    Marital status:    Tobacco Use    Smoking status: Never     Passive exposure: Never    Smokeless tobacco: Never   Vaping Use    Vaping status: Never Used   Substance and Sexual Activity    Alcohol use: No    Drug use: No    Sexual activity: Yes     Partners: Male     Birth control/protection: Condom, None         Current Outpatient Medications:     Prenatal Vit-Fe Fum-FA-Omega (PNV Prenatal Plus Multivit+DHA) 27-1 & 312 MG misc, TAKE 1 TABLET BY MOUTH EVERY DAY, Disp: 30 each, Rfl: 0    propranolol (INDERAL) 10 MG tablet, Take 1 tablet  "by mouth Daily As Needed (anxiety)., Disp: 30 tablet, Rfl: 2    ZOLMitriptan (ZOMIG-ZMT) 5 MG disintegrating tablet, Place 1 tablet on the tongue 1 (One) Time As Needed for Migraine for up to 60 doses., Disp: 10 tablet, Rfl: 5    Objective   /70 (BP Location: Left arm)   Pulse 90   Ht 175.3 cm (69\")   Wt 56.2 kg (124 lb)   SpO2 97%   BMI 18.31 kg/m²   Physical Exam  Vitals reviewed.   Constitutional:       General: She is not in acute distress.     Appearance: Normal appearance. She is well-developed.   HENT:      Head: Normocephalic and atraumatic.      Right Ear: Tympanic membrane, ear canal and external ear normal.      Left Ear: Tympanic membrane, ear canal and external ear normal.      Nose: Nose normal.      Mouth/Throat:      Lips: Pink.      Mouth: Mucous membranes are moist.      Tongue: No lesions.      Pharynx: Oropharynx is clear.   Eyes:      General: Lids are normal. Vision grossly intact. No scleral icterus.     Conjunctiva/sclera: Conjunctivae normal.      Pupils: Pupils are equal, round, and reactive to light.   Neck:      Thyroid: No thyroid mass, thyromegaly or thyroid tenderness.      Vascular: No carotid bruit.   Cardiovascular:      Rate and Rhythm: Normal rate and regular rhythm.      Heart sounds: Normal heart sounds. No murmur heard.     No friction rub. No gallop. No S3 or S4 sounds.   Pulmonary:      Effort: Pulmonary effort is normal.      Breath sounds: Normal breath sounds. No wheezing, rhonchi or rales.   Abdominal:      General: Bowel sounds are normal. There is no distension.      Palpations: Abdomen is soft. There is no mass.      Tenderness: There is no abdominal tenderness.   Musculoskeletal:         General: Normal range of motion.      Cervical back: Neck supple.      Right lower leg: No edema.      Left lower leg: No edema.   Lymphadenopathy:      Cervical: No cervical adenopathy.   Skin:     General: Skin is warm and dry.      Coloration: Skin is not pale.      " Findings: No erythema or rash.   Neurological:      Mental Status: She is alert and oriented to person, place, and time. Mental status is at baseline.      Cranial Nerves: No cranial nerve deficit.      Sensory: No sensory deficit.      Gait: Gait is intact.      Comments: CNs grossly intact. Balance grossly intact.   Psychiatric:         Attention and Perception: Attention normal.         Mood and Affect: Mood normal.         Speech: Speech normal.         Behavior: Behavior normal.         Thought Content: Thought content normal.         Judgment: Judgment normal.         Assessment & Plan   Diagnoses and all orders for this visit:    1. Healthcare maintenance (Primary)  -     CBC (No Diff)  -     Comprehensive Metabolic Panel  -     Lipid Panel        1. Health Care Maintenance  -pap UTD  -mamm UTD  -colon cancer screening at 45  -fasting labs  -BMI is below normal parameters (malnutrition). Recommendations: Patient has lost weight due to dental issues    Reviewed the following with the patient: advised patient of need for:  pap smear, mammogram, and immunizations discussed.  Counseled regarding: age-appropriate screening labs and tests, wearing seatbelt and sunscreen regularly  Discussed: regular exercise and diet changes to promote healthy weight, checking skin regularly for abnormal moles and lesions

## 2024-10-22 LAB
ALBUMIN SERPL-MCNC: 4.6 G/DL (ref 3.5–5.2)
ALBUMIN/GLOB SERPL: 1.4 G/DL
ALP SERPL-CCNC: 60 U/L (ref 39–117)
ALT SERPL W P-5'-P-CCNC: 12 U/L (ref 1–33)
ANION GAP SERPL CALCULATED.3IONS-SCNC: 12.9 MMOL/L (ref 5–15)
AST SERPL-CCNC: 21 U/L (ref 1–32)
BILIRUB SERPL-MCNC: 0.3 MG/DL (ref 0–1.2)
BUN SERPL-MCNC: 18 MG/DL (ref 6–20)
BUN/CREAT SERPL: 17.8 (ref 7–25)
CALCIUM SPEC-SCNC: 9.8 MG/DL (ref 8.6–10.5)
CHLORIDE SERPL-SCNC: 102 MMOL/L (ref 98–107)
CHOLEST SERPL-MCNC: 120 MG/DL (ref 0–200)
CO2 SERPL-SCNC: 23.1 MMOL/L (ref 22–29)
CREAT SERPL-MCNC: 1.01 MG/DL (ref 0.57–1)
EGFRCR SERPLBLD CKD-EPI 2021: 71.4 ML/MIN/1.73
GLOBULIN UR ELPH-MCNC: 3.2 GM/DL
GLUCOSE SERPL-MCNC: 107 MG/DL (ref 65–99)
HDLC SERPL-MCNC: 52 MG/DL (ref 40–60)
LDLC SERPL CALC-MCNC: 57 MG/DL (ref 0–100)
LDLC/HDLC SERPL: 1.13 {RATIO}
POTASSIUM SERPL-SCNC: 4.7 MMOL/L (ref 3.5–5.2)
PROT SERPL-MCNC: 7.8 G/DL (ref 6–8.5)
SODIUM SERPL-SCNC: 138 MMOL/L (ref 136–145)
TRIGL SERPL-MCNC: 45 MG/DL (ref 0–150)
VLDLC SERPL-MCNC: 11 MG/DL (ref 5–40)

## 2024-10-22 RX ORDER — VITAMIN A, ASCORBIC ACID, CHOLECALCIFEROL, TOCOPHEROL, THIAMINE MONONITRATE, RIBOFLAVIN, PYRIDOXINE, FOLIC ACID, CYANOCOBALAMIN, CALCIUM CARBONATE, FERROUS FUMARATE, ZINC OXIDE, CUPRIC OXIDE, NIACINAMIDE, AND FISH OIL 27-1-250MG
1 KIT ORAL DAILY
Qty: 30 EACH | Refills: 5 | Status: SHIPPED | OUTPATIENT
Start: 2024-10-22

## 2024-12-03 ENCOUNTER — OFFICE VISIT (OUTPATIENT)
Dept: OBSTETRICS AND GYNECOLOGY | Facility: CLINIC | Age: 44
End: 2024-12-03
Payer: COMMERCIAL

## 2024-12-03 VITALS
DIASTOLIC BLOOD PRESSURE: 68 MMHG | HEIGHT: 69 IN | BODY MASS INDEX: 19.37 KG/M2 | SYSTOLIC BLOOD PRESSURE: 112 MMHG | WEIGHT: 130.8 LBS

## 2024-12-03 DIAGNOSIS — Z01.419 WOMEN'S ANNUAL ROUTINE GYNECOLOGICAL EXAMINATION: Primary | ICD-10-CM

## 2024-12-03 PROCEDURE — 99396 PREV VISIT EST AGE 40-64: CPT | Performed by: OBSTETRICS & GYNECOLOGY

## 2024-12-03 NOTE — PROGRESS NOTES
Gynecologic Annual Exam Note          GYN Annual Exam     Gynecologic Exam (Annual )        Subjective     HPI  Jos Zuleta is a 44 y.o. female, , who presents for annual well woman exam as a established patient. There were no changes to her medical or surgical history since her last visit..  Patient's last menstrual period was 2024.  Her periods occur every 25-30 days, lasting 5-6 days.  The flow is moderate. She reports dysmenorrhea is mild occurring first 1-2 days of flow. Marital Status: . She is sexually active. She has not had new partners.. STD testing recommendations have been explained to the patient and she declines STD testing.    The patient would like to discuss the following complaints today: none    Additional OB/GYN History   contraceptive methods: Condoms  Desires to: do not start contraception  History of migraines: yes with aura    Last Pap : 2023. Result: ASCUS. HPV: negative.   Last Completed Pap Smear            Ordered - PAP SMEAR (Every 3 Years) Ordered on 12/3/2024      2023  LIQUID-BASED PAP SMEAR WITH HPV GENOTYPING IF ASCUS ([facility])    2022  LIQUID-BASED PAP SMEAR, [facility] ([facility])    11/15/2021  SCANNED - PAP SMEAR    2020  SCANNED - PAP SMEAR    10/10/2019  Done - negative per antony    Only the first 5 history entries have been loaded, but more history exists.                  History of abnormal Pap smear: yes - Ascus  Family history of uterine, colon, breast, or ovarian cancer: yes - ovarian cancer  Performs monthly Self-Breast Exam: yes  Last mammogram: 2024. Done at . There is a copy in the chart.    Last Completed Mammogram            Ordered - MAMMOGRAM (Every 2 Years) Ordered on 12/3/2024      2024  Mammo Diagnostic Digital Tomosynthesis Bilateral With CAD    2022  Mammo Screening Digital Tomosynthesis Bilateral With CAD                    Colonoscopy: has never had a colonoscopy or  cologuard  Exercises Regularly: yes  Feelings of Anxiety or Depression: no  Tobacco Usage?: No       Current Outpatient Medications:     Prenatal Vit-Fe Fum-FA-Omega (PNV Prenatal Plus Multivit+DHA) 27-1 & 312 MG misc, Take 1 tablet by mouth Daily., Disp: 30 each, Rfl: 5    propranolol (INDERAL) 10 MG tablet, Take 1 tablet by mouth Daily As Needed (anxiety)., Disp: 30 tablet, Rfl: 2    ZOLMitriptan (ZOMIG-ZMT) 5 MG disintegrating tablet, Place 1 tablet on the tongue 1 (One) Time As Needed for Migraine for up to 60 doses., Disp: 10 tablet, Rfl: 11     Patient denies the need for medication refills today.    OB History          3    Para   2    Term   2            AB        Living   2         SAB        IAB        Ectopic        Molar        Multiple   0    Live Births   2                Past Medical History:   Diagnosis Date    Migraine     Pregnancy, incidental     Dr Maryellen Rodriguez        Past Surgical History:   Procedure Laterality Date     SECTION       SECTION N/A 08/15/2017    Procedure:  SECTION REPEAT;  Surgeon: Pearl Smith MD;  Location: [facility] LABOR DELIVERY;  Service:     MOUTH SURGERY      MOUTH SURGERY  2023    GUM GRAFTING    WISDOM TOOTH EXTRACTION         Health Maintenance   Topic Date Due    TDAP/TD VACCINES (1 - Tdap) Never done    HEPATITIS C SCREENING  Never done    COVID-19 Vaccine ( - - season) Never done    Annual Gynecologic Pelvic and Breast Exam  2024    ANNUAL PHYSICAL  10/21/2025    MAMMOGRAM  2026    PAP SMEAR  2026    INFLUENZA VACCINE  Completed    Pneumococcal Vaccine 0-64  Aged Out       The additional following portions of the patient's history were reviewed and updated as appropriate: allergies, current medications, past family history, past medical history, past social history, past surgical history, and problem list.    Review of Systems   Constitutional: Negative.    HENT: Negative.     Eyes: Negative.   "  Respiratory: Negative.     Cardiovascular: Negative.    Gastrointestinal: Negative.    Endocrine: Negative.    Genitourinary: Negative.    Musculoskeletal: Negative.    Skin: Negative.    Allergic/Immunologic: Negative.    Neurological: Negative.    Hematological: Negative.    Psychiatric/Behavioral: Negative.           I have reviewed and agree with the HPI, ROS, and historical information as entered above. Pearl Smith MD          Objective   /68   Ht 175.3 cm (69\")   Wt 59.3 kg (130 lb 12.8 oz)   LMP 11/17/2024   BMI 19.32 kg/m²     Physical Exam  Vitals and nursing note reviewed. Exam conducted with a chaperone present.   Constitutional:       Appearance: She is well-developed.   HENT:      Head: Normocephalic and atraumatic.   Eyes:      Pupils: Pupils are equal, round, and reactive to light.   Neck:      Thyroid: No thyroid mass or thyromegaly.   Pulmonary:      Effort: Pulmonary effort is normal. No retractions.   Chest:      Chest wall: No mass.   Breasts:     Right: Normal. No mass, nipple discharge, skin change or tenderness.      Left: Normal. No mass, nipple discharge, skin change or tenderness.   Abdominal:      General: Bowel sounds are normal.      Palpations: Abdomen is soft. Abdomen is not rigid. There is no mass.      Tenderness: There is no abdominal tenderness. There is no guarding.      Hernia: No hernia is present. There is no hernia in the left inguinal area or right inguinal area.   Genitourinary:     Exam position: Lithotomy position.      Pubic Area: No rash.       Labia:         Right: No rash, tenderness or lesion.         Left: No rash, tenderness or lesion.       Urethra: No urethral pain or urethral swelling.      Vagina: Normal. No vaginal discharge or lesions.      Cervix: No cervical motion tenderness, discharge, lesion or cervical bleeding.      Uterus: Normal. Not enlarged, not fixed and not tender.       Adnexa:         Right: No mass, tenderness or fullness.         "  Left: No mass, tenderness or fullness.        Rectum: No external hemorrhoid.   Musculoskeletal:      Cervical back: Normal range of motion. No muscular tenderness.      Right lower leg: No edema.      Left lower leg: No edema.   Skin:     General: Skin is warm and dry.   Neurological:      Mental Status: She is alert and oriented to person, place, and time.      Motor: Motor function is intact.   Psychiatric:         Mood and Affect: Mood and affect normal.         Behavior: Behavior normal.            Assessment and Plan    Problem List Items Addressed This Visit    None  Visit Diagnoses       Women's annual routine gynecological examination    -  Primary    Relevant Orders    LIQUID-BASED PAP SMEAR WITH HPV GENOTYPING REGARDLESS OF INTERPRETATION ([facility])    Mammo Screening Digital Tomosynthesis Bilateral With CAD            GYN annual well woman exam.   Pap guidelines reviewed.  Reviewed monthly self breast exams.  Instructed to call with lumps, pain, or breast discharge.    Ordered Mammogram today  Reviewed exercise as a preventative health measures.   Return in about 1 year (around 12/3/2025) for Annual physical.     Pearl Smith MD  12/03/2024

## 2024-12-04 LAB — REF LAB TEST METHOD: NORMAL

## 2025-02-12 ENCOUNTER — DOCUMENTATION (OUTPATIENT)
Dept: GENETICS | Facility: HOSPITAL | Age: 45
End: 2025-02-12
Payer: COMMERCIAL

## 2025-02-12 LAB
NCCN CRITERIA FLAG: ABNORMAL
TYRER CUZICK SCORE: 13.1

## 2025-02-13 ENCOUNTER — HOSPITAL ENCOUNTER (OUTPATIENT)
Dept: MAMMOGRAPHY | Facility: HOSPITAL | Age: 45
Discharge: HOME | End: 2025-02-13
Admitting: OBSTETRICS & GYNECOLOGY
Payer: COMMERCIAL

## 2025-02-13 DIAGNOSIS — Z01.419 WOMEN'S ANNUAL ROUTINE GYNECOLOGICAL EXAMINATION: ICD-10-CM

## 2025-02-13 PROCEDURE — 77067 SCR MAMMO BI INCL CAD: CPT

## 2025-02-13 PROCEDURE — 77063 BREAST TOMOSYNTHESIS BI: CPT

## 2025-05-12 RX ORDER — VITAMIN A, ASCORBIC ACID, CHOLECALCIFEROL, TOCOPHEROL, THIAMINE MONONITRATE, RIBOFLAVIN, PYRIDOXINE, FOLIC ACID, CYANOCOBALAMIN, CALCIUM CARBONATE, FERROUS FUMARATE, ZINC OXIDE, CUPRIC OXIDE, NIACINAMIDE, AND FISH OIL 27-1-250MG
KIT ORAL
Qty: 60 EACH | Refills: 0 | Status: SHIPPED | OUTPATIENT
Start: 2025-05-12

## 2025-06-30 RX ORDER — VITAMIN A, ASCORBIC ACID, CHOLECALCIFEROL, TOCOPHEROL, THIAMINE MONONITRATE, RIBOFLAVIN, PYRIDOXINE, FOLIC ACID, CYANOCOBALAMIN, CALCIUM CARBONATE, FERROUS FUMARATE, ZINC OXIDE, CUPRIC OXIDE, NIACINAMIDE, AND FISH OIL 27-1-250MG
KIT ORAL
Qty: 60 EACH | Refills: 0 | Status: SHIPPED | OUTPATIENT
Start: 2025-06-30

## (undated) DEVICE — COATED VICRYL  (POLYGLACTIN 910) SUTURE, VIOLET BRAIDED, STERILE, SYNTHETIC ABSORBABLE SUTURE: Brand: COATED VICRYL

## (undated) DEVICE — SUT PLAIN  3/0 CT1 27IN 842H

## (undated) DEVICE — PK C/SECT 10

## (undated) DEVICE — PROXIMATE RH ROTATING HEAD SKIN STAPLERS (35 WIDE) CONTAINS 35 STAINLESS STEEL STAPLES: Brand: PROXIMATE

## (undated) DEVICE — GLV SURG BIOGEL LTX PF 6 1/2

## (undated) DEVICE — SUT VIC 2/0 CT1 27IN J339H BX/36

## (undated) DEVICE — SOL IRR NACL 0.9PCT BT 1000ML

## (undated) DEVICE — SOL IRR H2O BTL 1000ML STRL

## (undated) DEVICE — SUT GUT CHRM 1 CTX 36IN 905H

## (undated) DEVICE — TRY SPINE BLCK WHITACRE 25G 3X5IN

## (undated) DEVICE — 39" SINGLE PATIENT USE HOVERMATT: Brand: SINGLE PATIENT USE HOVERMATT